# Patient Record
Sex: MALE | Race: WHITE | NOT HISPANIC OR LATINO | Employment: OTHER | URBAN - METROPOLITAN AREA
[De-identification: names, ages, dates, MRNs, and addresses within clinical notes are randomized per-mention and may not be internally consistent; named-entity substitution may affect disease eponyms.]

---

## 2021-01-29 DIAGNOSIS — Z23 ENCOUNTER FOR IMMUNIZATION: ICD-10-CM

## 2022-05-25 ENCOUNTER — HOSPITAL ENCOUNTER (INPATIENT)
Facility: HOSPITAL | Age: 77
LOS: 1 days | Discharge: HOME | DRG: 149 | End: 2022-05-27
Attending: EMERGENCY MEDICINE | Admitting: INTERNAL MEDICINE
Payer: MEDICARE

## 2022-05-25 ENCOUNTER — APPOINTMENT (EMERGENCY)
Dept: CT IMAGING | Facility: HOSPITAL | Age: 77
DRG: 149 | End: 2022-05-25
Payer: MEDICARE

## 2022-05-25 DIAGNOSIS — R42 VERTIGO: ICD-10-CM

## 2022-05-25 DIAGNOSIS — R42 DIZZINESS: Primary | ICD-10-CM

## 2022-05-25 DIAGNOSIS — I72.9 ANEURYSM (HCC): ICD-10-CM

## 2022-05-25 DIAGNOSIS — I82.C19 ACUTE VENOUS EMBOLISM AND THROMBOSIS OF INTERNAL JUGULAR VEINS (HCC): ICD-10-CM

## 2022-05-25 DIAGNOSIS — I16.0 HYPERTENSIVE URGENCY: ICD-10-CM

## 2022-05-25 PROBLEM — D49.0 IPMN (INTRADUCTAL PAPILLARY MUCINOUS NEOPLASM): Status: ACTIVE | Noted: 2017-06-12

## 2022-05-25 PROBLEM — I10 HYPERTENSION: Status: ACTIVE | Noted: 2022-05-25

## 2022-05-25 LAB
2HR DELTA HS TROPONIN: 0 NG/L
4HR DELTA HS TROPONIN: 2 NG/L
ANION GAP SERPL CALCULATED.3IONS-SCNC: 5 MMOL/L (ref 4–13)
APTT PPP: 27 SECONDS (ref 23–37)
BUN SERPL-MCNC: 13 MG/DL (ref 5–25)
CALCIUM SERPL-MCNC: 8.7 MG/DL (ref 8.3–10.1)
CARDIAC TROPONIN I PNL SERPL HS: 6 NG/L
CARDIAC TROPONIN I PNL SERPL HS: 6 NG/L
CARDIAC TROPONIN I PNL SERPL HS: 8 NG/L
CHLORIDE SERPL-SCNC: 104 MMOL/L (ref 100–108)
CO2 SERPL-SCNC: 30 MMOL/L (ref 21–32)
CREAT SERPL-MCNC: 1.2 MG/DL (ref 0.6–1.3)
ERYTHROCYTE [DISTWIDTH] IN BLOOD BY AUTOMATED COUNT: 11.5 % (ref 11.6–15.1)
GFR SERPL CREATININE-BSD FRML MDRD: 58 ML/MIN/1.73SQ M
GLUCOSE SERPL-MCNC: 118 MG/DL (ref 65–140)
GLUCOSE SERPL-MCNC: 129 MG/DL (ref 65–140)
HCT VFR BLD AUTO: 52 % (ref 36.5–49.3)
HGB BLD-MCNC: 17.5 G/DL (ref 12–17)
INR PPP: 0.98 (ref 0.84–1.19)
MCH RBC QN AUTO: 33 PG (ref 26.8–34.3)
MCHC RBC AUTO-ENTMCNC: 33.7 G/DL (ref 31.4–37.4)
MCV RBC AUTO: 98 FL (ref 82–98)
PLATELET # BLD AUTO: 217 THOUSANDS/UL (ref 149–390)
PMV BLD AUTO: 10.2 FL (ref 8.9–12.7)
POTASSIUM SERPL-SCNC: 4.5 MMOL/L (ref 3.5–5.3)
PROTHROMBIN TIME: 12.9 SECONDS (ref 11.6–14.5)
RBC # BLD AUTO: 5.31 MILLION/UL (ref 3.88–5.62)
SODIUM SERPL-SCNC: 139 MMOL/L (ref 136–145)
WBC # BLD AUTO: 7.41 THOUSAND/UL (ref 4.31–10.16)

## 2022-05-25 PROCEDURE — 70496 CT ANGIOGRAPHY HEAD: CPT

## 2022-05-25 PROCEDURE — 84484 ASSAY OF TROPONIN QUANT: CPT | Performed by: EMERGENCY MEDICINE

## 2022-05-25 PROCEDURE — 70498 CT ANGIOGRAPHY NECK: CPT

## 2022-05-25 PROCEDURE — 96374 THER/PROPH/DIAG INJ IV PUSH: CPT

## 2022-05-25 PROCEDURE — 85730 THROMBOPLASTIN TIME PARTIAL: CPT | Performed by: EMERGENCY MEDICINE

## 2022-05-25 PROCEDURE — 36415 COLL VENOUS BLD VENIPUNCTURE: CPT | Performed by: EMERGENCY MEDICINE

## 2022-05-25 PROCEDURE — NC001 PR NO CHARGE: Performed by: PSYCHIATRY & NEUROLOGY

## 2022-05-25 PROCEDURE — 99222 1ST HOSP IP/OBS MODERATE 55: CPT | Performed by: PSYCHIATRY & NEUROLOGY

## 2022-05-25 PROCEDURE — 93005 ELECTROCARDIOGRAM TRACING: CPT

## 2022-05-25 PROCEDURE — 99285 EMERGENCY DEPT VISIT HI MDM: CPT

## 2022-05-25 PROCEDURE — 92610 EVALUATE SWALLOWING FUNCTION: CPT

## 2022-05-25 PROCEDURE — 82948 REAGENT STRIP/BLOOD GLUCOSE: CPT

## 2022-05-25 PROCEDURE — 99291 CRITICAL CARE FIRST HOUR: CPT | Performed by: EMERGENCY MEDICINE

## 2022-05-25 PROCEDURE — 85610 PROTHROMBIN TIME: CPT | Performed by: EMERGENCY MEDICINE

## 2022-05-25 PROCEDURE — 80048 BASIC METABOLIC PNL TOTAL CA: CPT | Performed by: EMERGENCY MEDICINE

## 2022-05-25 PROCEDURE — 85027 COMPLETE CBC AUTOMATED: CPT | Performed by: EMERGENCY MEDICINE

## 2022-05-25 PROCEDURE — 99220 PR INITIAL OBSERVATION CARE/DAY 70 MINUTES: CPT | Performed by: PHYSICIAN ASSISTANT

## 2022-05-25 PROCEDURE — 1123F ACP DISCUSS/DSCN MKR DOCD: CPT | Performed by: PHYSICIAN ASSISTANT

## 2022-05-25 RX ORDER — CARVEDILOL 12.5 MG/1
12.5 TABLET ORAL 2 TIMES DAILY WITH MEALS
Status: DISCONTINUED | OUTPATIENT
Start: 2022-05-25 | End: 2022-05-27 | Stop reason: HOSPADM

## 2022-05-25 RX ORDER — PRAVASTATIN SODIUM 40 MG
40 TABLET ORAL
Status: DISCONTINUED | OUTPATIENT
Start: 2022-05-25 | End: 2022-05-27 | Stop reason: HOSPADM

## 2022-05-25 RX ORDER — ENOXAPARIN SODIUM 100 MG/ML
40 INJECTION SUBCUTANEOUS DAILY
Status: DISCONTINUED | OUTPATIENT
Start: 2022-05-25 | End: 2022-05-27 | Stop reason: HOSPADM

## 2022-05-25 RX ORDER — MECLIZINE HCL 12.5 MG/1
25 TABLET ORAL EVERY 8 HOURS PRN
Status: DISCONTINUED | OUTPATIENT
Start: 2022-05-25 | End: 2022-05-27 | Stop reason: HOSPADM

## 2022-05-25 RX ORDER — PANCRELIPASE LIPASE, PANCRELIPASE PROTEASE, PANCRELIPASE AMYLASE 25000; 79000; 105000 [USP'U]/1; [USP'U]/1; [USP'U]/1
1 CAPSULE, DELAYED RELEASE ORAL
COMMUNITY
Start: 2022-05-02

## 2022-05-25 RX ORDER — LABETALOL HYDROCHLORIDE 5 MG/ML
10 INJECTION, SOLUTION INTRAVENOUS ONCE
Status: COMPLETED | OUTPATIENT
Start: 2022-05-25 | End: 2022-05-25

## 2022-05-25 RX ORDER — PANTOPRAZOLE SODIUM 40 MG/1
TABLET, DELAYED RELEASE ORAL
Status: ON HOLD | COMMUNITY
End: 2022-05-25 | Stop reason: ALTCHOICE

## 2022-05-25 RX ORDER — CARVEDILOL 12.5 MG/1
12.5 TABLET ORAL 2 TIMES DAILY WITH MEALS
COMMUNITY

## 2022-05-25 RX ORDER — METHOCARBAMOL 500 MG/1
500 TABLET, FILM COATED ORAL EVERY 6 HOURS PRN
Status: DISCONTINUED | OUTPATIENT
Start: 2022-05-25 | End: 2022-05-27 | Stop reason: HOSPADM

## 2022-05-25 RX ORDER — OMEGA-3 FATTY ACIDS/FISH OIL 300-1000MG
600 CAPSULE ORAL AS NEEDED
COMMUNITY
End: 2022-05-27

## 2022-05-25 RX ORDER — ASPIRIN 81 MG/1
81 TABLET, CHEWABLE ORAL DAILY
Status: DISCONTINUED | OUTPATIENT
Start: 2022-05-26 | End: 2022-05-27 | Stop reason: HOSPADM

## 2022-05-25 RX ORDER — SIMVASTATIN 20 MG
20 TABLET ORAL EVERY EVENING
COMMUNITY

## 2022-05-25 RX ORDER — ASPIRIN 325 MG
325 TABLET ORAL ONCE
Status: COMPLETED | OUTPATIENT
Start: 2022-05-25 | End: 2022-05-25

## 2022-05-25 RX ADMIN — ASPIRIN 325 MG ORAL TABLET 325 MG: 325 PILL ORAL at 10:08

## 2022-05-25 RX ADMIN — PRAVASTATIN SODIUM 40 MG: 40 TABLET ORAL at 17:26

## 2022-05-25 RX ADMIN — ENOXAPARIN SODIUM 40 MG: 100 INJECTION SUBCUTANEOUS at 14:03

## 2022-05-25 RX ADMIN — CARVEDILOL 12.5 MG: 12.5 TABLET, FILM COATED ORAL at 17:26

## 2022-05-25 RX ADMIN — IOHEXOL 80 ML: 350 INJECTION, SOLUTION INTRAVENOUS at 08:41

## 2022-05-25 RX ADMIN — LABETALOL 20 MG/4 ML (5 MG/ML) INTRAVENOUS SYRINGE 10 MG: at 08:42

## 2022-05-25 NOTE — ASSESSMENT & PLAN NOTE
· CT scan negative   MRI ordered    CTA head/neck: 3 mm basilar tip aneurysm is present   Recommend follow-up consultation with the [facility], a division of [address] [facility]    ECHO pending   Admission NIHSS 0   Stroke pathway   Permissive hypertension not indicated per Neurology   DVT prophylaxis    PT/OT/ST   ASA   Statin   Lipid panel/Hgb A1C ordered   Micky Ramirez Neurology consult; recommendations appreciated   Meclizine prn

## 2022-05-25 NOTE — PLAN OF CARE
Problem: PAIN - ADULT  Goal: Verbalizes/displays adequate comfort level or baseline comfort level  Description: Interventions:  - Encourage patient to monitor pain and request assistance  - Assess pain using appropriate pain scale  - Administer analgesics based on type and severity of pain and evaluate response  - Implement non-pharmacological measures as appropriate and evaluate response  - Consider cultural and social influences on pain and pain management  - Notify physician/advanced practitioner if interventions unsuccessful or patient reports new pain  Outcome: Progressing     Problem: INFECTION - ADULT  Goal: Absence or prevention of progression during hospitalization  Description: INTERVENTIONS:  - Assess and monitor for signs and symptoms of infection  - Monitor lab/diagnostic results  - Monitor all insertion sites, i e  indwelling lines, tubes, and drains  - Monitor endotracheal if appropriate and nasal secretions for changes in amount and color  - [place] appropriate cooling/warming therapies per order  - Administer medications as ordered  - Instruct and encourage patient and family to use good hand hygiene technique  - Identify and instruct in appropriate isolation precautions for identified infection/condition  Outcome: Progressing  Goal: Absence of fever/infection during neutropenic period  Description: INTERVENTIONS:  - Monitor WBC    Outcome: Progressing     Problem: SAFETY ADULT  Goal: Patient will remain free of falls  Description: INTERVENTIONS:  - Educate patient/family on patient safety including physical limitations  - Instruct patient to call for assistance with activity   - Consult OT/PT to assist with strengthening/mobility   - Keep Call bell within reach  - Keep bed low and locked with side rails adjusted as appropriate  - Keep care items and personal belongings within reach  - Initiate and maintain comfort rounds  - Make Fall Risk Sign visible to staff  - Offer Toileting every 2 Hours, in advance of need    - Apply yellow socks and bracelet for high fall risk patients  - Consider moving patient to room near nurses station  Outcome: Progressing  Goal: Maintain or return to baseline ADL function  Description: INTERVENTIONS:  -  Assess patient's ability to carry out ADLs; assess patient's baseline for ADL function and identify physical deficits which impact ability to perform ADLs (bathing, care of mouth/teeth, toileting, grooming, dressing, etc )  - Assess/evaluate cause of self-care deficits   - Assess range of motion  - Assess patient's mobility; develop plan if impaired  - Assess patient's need for assistive devices and provide as appropriate  - Encourage maximum independence but intervene and supervise when necessary  - Involve family in performance of ADLs  - Assess for home care needs following discharge   - Consider OT consult to assist with ADL evaluation and planning for discharge  - Provide patient education as appropriate  Outcome: Progressing  Goal: Maintains/Returns to pre admission functional level  Description: INTERVENTIONS:  - Perform BMAT or MOVE assessment daily    - Set and communicate daily mobility goal to care team and patient/family/caregiver  - Collaborate with rehabilitation services on mobility goals if consulted  - Perform Range of Motion 2 times a day  - Reposition patient every 2 hours    - Dangle patient 2 times a day  - Stand patient 2 times a day  - Ambulate patient 2 times a day  - Out of bed to chair 2 times a day   - Out of bed for meals 2 times a day  - Out of bed for toileting  - Record patient progress and toleration of activity level   Outcome: Progressing     Problem: DISCHARGE PLANNING  Goal: Discharge to home or other facility with appropriate resources  Description: INTERVENTIONS:  - Identify barriers to discharge w/patient and caregiver  - Arrange for needed discharge resources and transportation as appropriate  - Identify discharge learning needs (meds, wound care, etc )  - Arrange for interpretive services to assist at discharge as needed  - Refer to Case Management Department for coordinating discharge planning if the patient needs post-hospital services based on physician/advanced practitioner order or complex needs related to functional status, cognitive ability, or social support system  Outcome: Progressing     Problem: Knowledge Deficit  Goal: Patient/family/caregiver demonstrates understanding of disease process, treatment plan, medications, and discharge instructions  Description: Complete learning assessment and assess knowledge base  Interventions:  - Provide teaching at level of understanding  - Provide teaching via preferred learning methods  Outcome: Progressing     Problem: Neurological Deficit  Goal: Neurological status is stable or improving  Description: Interventions:  - Monitor and assess patient's level of consciousness, motor function, sensory function, and level of assistance needed for ADLs  - Monitor and report changes from baseline  Collaborate with interdisciplinary team to initiate plan and implement interventions as ordered  - Provide and maintain a safe environment  - Consider seizure precautions  - Consider fall precautions  - Consider aspiration precautions  - Consider bleeding precautions  Outcome: Progressing     Problem: Activity Intolerance/Impaired Mobility  Goal: Mobility/activity is maintained at optimum level for patient  Description: Interventions:  - Assess and monitor patient  barriers to mobility and need for assistive/adaptive devices  - Assess patient's emotional response to limitations  - Collaborate with interdisciplinary team and initiate plans and interventions as ordered  - Encourage independent activity per ability   - Maintain proper body alignment  - Perform active/passive rom as tolerated/ordered    - Plan activities to conserve energy   - Turn patient as appropriate  Outcome: Progressing Problem: Communication Impairment  Goal: Ability to express needs and understand communication  Description: Assess patient's communication skills and ability to understand information  Patient will demonstrate use of effective communication techniques, alternative methods of communication and understanding even if not able to speak  - Encourage communication and provide alternate methods of communication as needed  - Collaborate with case management/ for discharge needs  - Include patient/family/caregiver in decisions related to communication  Outcome: Progressing     Problem: Nutrition  Goal: Nutrition/Hydration status is improving  Description: Monitor and assess patient's nutrition/hydration status for malnutrition (ex- brittle hair, bruises, dry skin, pale skin and conjunctiva, muscle wasting, smooth red tongue, and disorientation)  Collaborate with interdisciplinary team and initiate plan and interventions as ordered  Monitor patient's weight and dietary intake as ordered or per policy  Utilize nutrition screening tool and intervene per policy  Determine patient's food preferences and provide high-protein, high-caloric foods as appropriate  - Assist patient with eating   - Allow adequate time for meals   - Encourage patient to take dietary supplement as ordered  - Collaborate with clinical nutritionist   - Include patient/family/caregiver in decisions related to nutrition    Outcome: Progressing     Problem: CARDIOVASCULAR - ADULT  Goal: Maintains optimal cardiac output and hemodynamic stability  Description: INTERVENTIONS:  - Monitor I/O, vital signs and rhythm  - Monitor for S/S and trends of decreased cardiac output  - Administer and titrate ordered vasoactive medications to optimize hemodynamic stability  - Assess quality of pulses, skin color and temperature  - Assess for signs of decreased coronary artery perfusion  - Instruct patient to report change in severity of symptoms  Outcome: Progressing

## 2022-05-25 NOTE — ASSESSMENT & PLAN NOTE
68 y o with HTN and HLD and PMHx of throat cancer in 1992 who presents with vertigo  He states he felt normal aorund 9 pm last night and at 11pm he felt that the room was spinning  His vertigo was worsened with head movement  He also reported that he vomitted x 2  He denied slurred speech, word finding difficulty, facial asymmetry, numbness or weakness  He denied recent trauma or head strike  He attempted to sleep it off, however it persisted into this morning which prompted him to the ED  Upon arrival to [facility], /100  NIH 0  CTH/CTA revealed 3 mm basilar tip aneurysm with a recommendation to follow up with neurovascular, however negative for  significant stenosis or LVO  Patient was deemed not a tPA candidate  Plan:  - Stroke pathway  - Initiate Aspirin 81 mg  - Switch from Simvastatin home medication to Atorvastatin 40 mg  - MRI brain with and without pending   - Defer echo for now per Attending Neurologist   - Hemoglobin A1c, Lipid Panel pending   - Maintain normotension, no need for permissive HTN    - Euglycemic, normothermic goal  - Continue telemetry  - PT/OT/ST  - Secondary risk factor modification    - Stroke education  - Frequent neuro checks  Continue to monitor and notify neurology with any changes   - STAT CT head for any acute change in neuro exam  - Medical management and supportive care per primary team  Correction of any metabolic or infectious disturbances    - Recommend Neurosurgery consult for 3 mm aneurysm at basilar tip   - Recommend Meclizine PRN   Plan discussed with Attending Neurologist, please see attestation for further input/recommendations

## 2022-05-25 NOTE — SPEECH THERAPY NOTE
Speech Language/Pathology  Speech/Language Pathology  Assessment    Patient Name: Yonathan Vega  BXBYS'G Date: 5/25/2022     Problem List  Principal Problem:    Dizziness  Active Problems:    Vertigo    IPMN (intraductal papillary mucinous neoplasm)    Hypertension    Past Medical History  Past Medical History:   Diagnosis Date    Hypertension     Throat cancer ([address] )     1992     Past Surgical History  Past Surgical History:   Procedure Laterality Date    NECK DISSECTION Right     1993    PANCREATICODUODENECTOMY      2017        Bedside Swallow Evaluation:    Summary:  Pt presents w/ [place] swallow skill  Pt w/ no significant difficulties with normal mastication, bolus formation, and bolus transfer  Swallow initiation was suspected prompt  No overt s/s of aspiration  Education provided on strategies to optimize swallow safety and s/s dysphagia/aspiration to notify his medical team of should they arise  Pt demonstrated understanding and denied questions at this time  Recommendations:  Diet: Regular  Liquid: Thin  Meds: As tolerated, consider in puree if pt w/ continued difficulty  Positioning:Upright  Strategies: Pt to take PO/Meds only when fully alert and upright  Oral care: 3-4x daily  Aspiration precautions  Reflux precautions    Eval only, No f/u tx indicated  H&P/Admit info/ pertinent provider notes: (PMH noted above)    Chief Complaint   Patient presents with    Neck Pain    Dizziness       To ED with c/o dizziness when lying down  Has had neck pain for one year and dizziness started yesterday after doing neck exercises        This is a 79-year-old male who presents for evaluation of dizziness that started last evening after doing neck exercises  Does have true vertiginous symptoms with nausea and vomiting when lying back or on the left side  Has had chronic neck pain for approximately 1 year  Has a prior history of radical neck dissection for cancer  No recent trauma or blood thinners   He is noted to be hypertensive at 239/100 and did not take his morning Coreg as of yet  Complains of feeling foggy in the head      Special Studies:    CT stroke 5/25: No hemorrhage or signs of acute territorial infarction  Microangiopathic changes        Tiny focal outpouching arising from the basilar tip on coronal imaging suspicious for basilar tip aneurysm  See separate CT angiogram report      CTA stroke 5/25: No stenosis, occlusion or thrombosis of the cervical or intracranial vasculature  No evidence of dissection, as clinically questioned      3 mm basilar tip aneurysm is present  Recommend follow-up consultation with the [facility], a division of [address] [place] [address] Neuroscience at [phone]  Goal(s):  Pt will tolerate least restrictive diet w/out s/s aspiration or oral/pharyngeal difficulties  Patient's goal: "Now that I ate, I feel better"     Did the pt report pain? No  If yes, was nursing notified/was it addressed? N/A    Reason for consult:  R/o aspiration  Determine safest and least restrictive diet  New neuro event  Stroke protocol  C/o pill dysphagia  C/o liquid dysphagia      Food allergies:  None    Current diet: Regular w/thin    Premorbid diet: Regular w/thin    O2 requirement: RA    Voice/Speech: Clear    Social: Lives alone    Follows commands: Yes                          Cognitive Status: Awake and alert    Oral mech exam:  Dentition: Natural, adequate   Labial strength and ROM: WFL  Lingual strength and ROM: WFL  Mandibular strength and ROM: WFL  Velum: WFL  Secretion management: WFL    Items administered:  Puree,hard/regular solid,thin liquids   Liquids were taken by straw    Oral stage:  Lip closure: WFL  Mastication: WFL  Bolus formation: WFL  Bolus control: WFL  Transfer: WFL  Oral residue: No  Pocketing: No    Pharyngeal stage:  Swallow promptness: suspect prompt  Laryngeal rise: WFL to palpation   Wet voice: No  Throat clear: No  Cough: No  Secondary swallows: No  Audible swallows: No  No overt s/s aspiration    Esophageal stage:  H/o GERD    Results d/w:  Pt, nursing

## 2022-05-25 NOTE — H&P
[facility]  H&P- Lakisha Siegel 1945, 68 y o  male MRN: [id number]  Unit/Bed#: -01 Encounter: [id number]  Primary Care Provider: Loni Hoyos   Date and time admitted to hospital: 5/25/2022  7:34 AM    * Dizziness  Assessment & Plan  · CT scan negative   MRI ordered    CTA head/neck: 3 mm basilar tip aneurysm is present  Recommend follow-up consultation with the [facility], a division of [address] [place] [address] for Neuroscience    ECHO pending   Admission NIHSS 0   Stroke pathway   Permissive hypertension not indicated per Neurology   DVT prophylaxis    PT/OT/ST   ASA   Statin   Lipid panel/Hgb A1C ordered   [facility] Neurology consult; recommendations appreciated   Meclizine prn      Hypertension  Assessment & Plan  · Managed on Coreg outpatient  · Pressures elevated in ED     IPMN (intraductal papillary mucinous neoplasm)  Assessment & Plan  · September 15, 2017 he underwent an exploratory laparotomy, open cholecystectomy and pylorus preserving pancreatico duodenectomy for a serous cystadenoma  VTE Pharmacologic Prophylaxis: VTE Score: 3 Moderate Risk (Score 3-4) - Pharmacological DVT Prophylaxis Ordered: enoxaparin (Lovenox)  Code Status: Level 1 - Full Code   Discussion with family: Attempted to update  (son) via phone  Left voicemail  Anticipated Length of Stay: Patient will be admitted on an observation basis with an anticipated length of stay of less than 2 midnights secondary to stroke rule out  Total Time for Visit, including Counseling / Coordination of Care: 60 minutes Greater than 50% of this total time spent on direct patient counseling and coordination of care  Chief Complaint: dizziness    History of Present Illness:  Lakisha Siegel is a 68 y o  male with a PMH of IPMN s/p surgical intervention in 2017 who presents with dizziness evening prior to admission     Patient presents after experiencing dizziness on the night prior to admission  He has been having some neck stiffness and his PCP gave him neck exercises, rotating from side to side, up and down, dizziness occurred during these exercises  Dizziness was so bad patient experienced nausea and vomited twice  He reported room spinning sensation that got worse with positional changes  It did improve while lying on his right side slightly  Patient denies any fever, chills, dizziness  His neck pain is still persistent, has trialed muscle relaxer in the past for back pain and is agreeable to try again  Neurology evaluated patient, admitted under stroke pathway  Initial imaging was negative aside from 3 mm aneurysm noted on CTA  Follow-up on additional imaging, echocardiogram and labs  Patient ambulating independently in his room and appears stable  Dizziness is somewhat improved  Continue p r n  Meclizine  PT/OT to evaluate patient, may qualify for vestibular rehab on discharge  Review of Systems:  Review of Systems   Gastrointestinal: Positive for nausea and vomiting  Neurological: Positive for dizziness  All other systems reviewed and are negative  Past Medical and Surgical History:   Past Medical History:   Diagnosis Date    Hypertension     [address] [address] )     1992       Past Surgical History:   Procedure Laterality Date    NECK DISSECTION Right     1993    PANCREATICODUODENECTOMY      2017       Meds/Allergies:  Prior to Admission medications    Medication Sig Start Date End Date Taking? Authorizing Provider   carvedilol (COREG) 12 5 mg tablet Take 12 5 mg by mouth in the morning and 12 5 mg in the evening  Take with meals     Yes Historical Provider, MD   Pancrelipase, Lip-Prot-Amyl, (Zenpep) 35096-29864 units CPEP Take 1 capsule by mouth 3 (three) times a day with meals 5/2/22  Yes Historical Provider, MD   simvastatin (ZOCOR) 20 mg tablet Take 20 mg by mouth every evening   Yes Historical Provider, MD   Ibuprofen 200 MG CAPS Take 600 mg by mouth as needed    Historical Provider, MD   pantoprazole (PROTONIX) 40 mg tablet 1 tablet  22  Historical Provider, MD     I have reviewed home medications with patient personally  Allergies: Allergies   Allergen Reactions    Gadolinium Headache       Social History:  Marital Status: Single   Occupation:  Patient Pre-hospital Living Situation: Home  Patient Pre-hospital Level of Mobility: walks  Patient Pre-hospital Diet Restrictions: None  Substance Use History:   Social History     Substance and Sexual Activity   Alcohol Use Yes    Comment: social     Social History     Tobacco Use   Smoking Status Former Smoker    Quit date:     Years since quittin 4   Smokeless Tobacco Former User     Social History     Substance and Sexual Activity   Drug Use Never       Family History:  History reviewed  No pertinent family history  Physical Exam:     Vitals:   Blood Pressure: 157/87 (22 0945)  Pulse: 59 (22 0945)  Temperature: (!) 97 3 °F (36 3 °C) (22 0738)  Temp Source: Tympanic (22 0738)  Respirations: 18 (22 0945)  Height: 6' 4" (193 cm) (22 4647)  Weight - Scale: 89 9 kg (198 lb 3 1 oz) (22 0738)  SpO2: 95 % (22 0945)    Physical Exam  Vitals and nursing note reviewed  Constitutional:       General: He is not in acute distress  Appearance: Normal appearance  He is well-developed  HENT:      Head: Normocephalic and atraumatic  Eyes:      General: No scleral icterus  Conjunctiva/sclera: Conjunctivae normal    Cardiovascular:      Rate and Rhythm: Normal rate and regular rhythm  Heart sounds: No murmur heard  Pulmonary:      Effort: Pulmonary effort is normal       Breath sounds: No wheezing, rhonchi or rales  Abdominal:      General: There is no distension  Palpations: Abdomen is soft  Skin:     General: Skin is warm and dry  Neurological:      General: No focal deficit present  Mental Status: He is alert  Cranial Nerves: No cranial nerve deficit, dysarthria or facial asymmetry  Sensory: No sensory deficit  Psychiatric:         Mood and Affect: Mood normal         Additional Data:     Lab Results:  Results from last 7 days   Lab Units 05/25/22  0824   WBC Thousand/uL 7 41   HEMOGLOBIN g/dL 17 5*   HEMATOCRIT % 52 0*   PLATELETS Thousands/uL 217     Results from last 7 days   Lab Units 05/25/22  0824   SODIUM mmol/L 139   POTASSIUM mmol/L 4 5   CHLORIDE mmol/L 104   CO2 mmol/L 30   BUN mg/dL 13   CREATININE mg/dL 1 20   ANION GAP mmol/L 5   CALCIUM mg/dL 8 7   GLUCOSE RANDOM mg/dL 118     Results from last 7 days   Lab Units 05/25/22  0824   INR  0 98     Results from last 7 days   Lab Units 05/25/22  0826   POC GLUCOSE mg/dl 129               Imaging: Reviewed radiology reports from this admission including: CT head  CTA stroke alert (head/neck)   Final Result by Jayro Morales DO (05/25 2854)      No stenosis, occlusion or thrombosis of the cervical or intracranial vasculature  No evidence of dissection, as clinically questioned  3 mm basilar tip aneurysm is present  Recommend follow-up consultation with the [facility], a division of [facility] for Neuroscience at [phone]  Findings were directly discussed with Franky Mcrae  at 8:48 AM                         Workstation performed: [id number]         CT stroke alert brain   Final Result by Jayro Morales DO (05/25 6631)      No hemorrhage or signs of acute territorial infarction  Microangiopathic changes  Tiny focal outpouching arising from the basilar tip on coronal imaging suspicious for basilar tip aneurysm  See separate CT angiogram report  Findings were directly discussed with Franky Mcrae  at 8:48 AM       Workstation performed: [id number]         MRI Inpatient Order    (Results Pending)       EKG and Other Studies Reviewed on Admission:   · EKG: NSR   HR 57     ** Please Note: This note has been constructed using a voice recognition system   **

## 2022-05-25 NOTE — ASSESSMENT & PLAN NOTE
· September 15, 2017 he underwent an exploratory laparotomy, open cholecystectomy and pylorus preserving pancreatico duodenectomy for a serous cystadenoma

## 2022-05-25 NOTE — ED PROVIDER NOTES
History  Chief Complaint   Patient presents with    Neck Pain    Dizziness     To ED with c/o dizziness when lying down  Has had neck pain for one year and dizziness started yesterday after doing neck exercises  This is a 51-year-old male who presents for evaluation of dizziness that started last evening after doing neck exercises  Does have true vertiginous symptoms with nausea and vomiting when lying back or on the left side  Has had chronic neck pain for approximately 1 year  Has a prior history of radical neck dissection for cancer  No recent trauma or blood thinners  He is noted to be hypertensive at 239/100 and did not take his morning Coreg as of yet  Complains of feeling foggy in the head      History provided by:  Patient  Medical Problem  Location:   generalized  Quality:   dizziness  Severity:  Moderate  Onset quality:  Sudden  Duration:  12 hours  Timing:  Intermittent  Progression:  Waxing and waning  Chronicity:  New  Context:   dizziness that started at 8:00 p m  after doing neck exercises  Worsened by:   lying flat or left side      Prior to Admission Medications   Prescriptions Last Dose Informant Patient Reported? Taking? Ibuprofen 200 MG CAPS   Yes No   Sig: Take 600 mg by mouth as needed   Pancrelipase, Lip-Prot-Amyl, (Zenpep) 53427-03801 units CPEP   Yes Yes   Sig: Take 1 capsule by mouth 3 (three) times a day with meals   carvedilol (COREG) 12 5 mg tablet  Self Yes Yes   Sig: Take 12 5 mg by mouth in the morning and 12 5 mg in the evening  Take with meals  simvastatin (ZOCOR) 20 mg tablet   Yes Yes   Sig: Take 20 mg by mouth every evening      Facility-Administered Medications: None       Past Medical History:   Diagnosis Date    Hypertension     Throat cancer ([facility] [address] )     1992       Past Surgical History:   Procedure Laterality Date    NECK DISSECTION Right     1993    PANCREATICODUODENECTOMY      2017       History reviewed  No pertinent family history    I have reviewed and agree with the history as documented  E-Cigarette/Vaping    E-Cigarette Use Never User      E-Cigarette/Vaping Substances    Nicotine No     Flavoring No      Social History     Tobacco Use    Smoking status: Former Smoker     Quit date:      Years since quittin 4    Smokeless tobacco: Former User   Vaping Use    Vaping Use: Never used   Substance Use Topics    Alcohol use: Yes     Comment: social    Drug use: Never       Review of Systems   Neurological: Positive for dizziness  All other systems reviewed and are negative  Physical Exam  Physical Exam  Vitals and nursing note reviewed  Constitutional:       General: He is not in acute distress  Appearance: He is not ill-appearing, toxic-appearing or diaphoretic  HENT:      Head: Normocephalic and atraumatic  Right Ear: Tympanic membrane, ear canal and external ear normal       Left Ear: Tympanic membrane, ear canal and external ear normal       Nose: Nose normal    Eyes:      General: No scleral icterus  Right eye: No discharge  Left eye: No discharge  Extraocular Movements: Extraocular movements intact  Pupils: Pupils are equal, round, and reactive to light  Neck:      Comments: Status post radical neck dissection  Cardiovascular:      Rate and Rhythm: Regular rhythm  Bradycardia present  Pulses: Normal pulses  Heart sounds: No murmur heard  No friction rub  No gallop  Pulmonary:      Effort: Pulmonary effort is normal  No respiratory distress  Breath sounds: No stridor  No wheezing, rhonchi or rales  Abdominal:      General: There is no distension  Palpations: Abdomen is soft  Tenderness: There is no abdominal tenderness  There is no guarding or rebound  Musculoskeletal:         General: No swelling, deformity or signs of injury  Normal range of motion  Cervical back: Tenderness present  Right lower leg: No edema  Left lower leg: No edema     Skin: General: Skin is warm and dry  Coloration: Skin is not jaundiced  Findings: No bruising, erythema or rash  Neurological:      Mental Status: He is alert and oriented to person, place, and time  Cranial Nerves: No cranial nerve deficit  Sensory: No sensory deficit  Motor: No weakness  Coordination: Coordination normal       Deep Tendon Reflexes: Reflexes normal       Comments: Marshall coma scale is 15 NIH stroke scale of 0 last known normal was 8:00 p m  last evening stroke alert was called spoke with Neurology  Patient does have positive Romberg and slight left pronator drift on examination   Psychiatric:         Mood and Affect: Mood normal          Behavior: Behavior normal          Thought Content:  Thought content normal          Vital Signs  ED Triage Vitals [05/25/22 0738]   Temperature Pulse Respirations Blood Pressure SpO2   (!) 97 3 °F (36 3 °C) 78 18 (!) 239/100 99 %      Temp Source Heart Rate Source Patient Position - Orthostatic VS BP Location FiO2 (%)   Tympanic Monitor Sitting Right arm --      Pain Score       5           Vitals:    05/25/22 0945 05/25/22 1300 05/25/22 1400 05/25/22 1502   BP: 157/87 146/84 130/75 131/72   Pulse: 59 65  65   Patient Position - Orthostatic VS:  Lying           Visual Acuity  Visual Acuity    Flowsheet Row Most Recent Value   L Pupil Size (mm) 2   R Pupil Size (mm) 2          ED Medications  Medications   carvedilol (COREG) tablet 12 5 mg (has no administration in time range)   Pancrelipase (Lip-Prot-Amyl) 99074-53259 units CPEP 1 capsule (1 capsule Oral Not Given 5/25/22 1328)   pravastatin (PRAVACHOL) tablet 40 mg (has no administration in time range)   methocarbamol (ROBAXIN) tablet 500 mg (has no administration in time range)   aspirin chewable tablet 81 mg (has no administration in time range)   enoxaparin (LOVENOX) subcutaneous injection 40 mg (40 mg Subcutaneous Given 5/25/22 1403)   meclizine (ANTIVERT) tablet 25 mg (has no administration in time range)   labetalol (NORMODYNE) injection 10 mg (10 mg Intravenous Given 5/25/22 0842)   iohexol (OMNIPAQUE) 350 MG/ML injection (SINGLE-DOSE) 100 mL (80 mL Intravenous Given 5/25/22 0841)   aspirin tablet 325 mg (325 mg Oral Given 5/25/22 1008)       Diagnostic Studies  Results Reviewed     Procedure Component Value Units Date/Time    HS Troponin I 4hr [[id number]] Collected: 05/25/22 1549    Lab Status:  In process Specimen: Blood from Hand, Right Updated: 05/25/22 1556    HS Troponin I 2hr [[id number]]  (Normal) Collected: 05/25/22 1014    Lab Status: Final result Specimen: Blood from Arm, Left Updated: 05/25/22 1050     hs TnI 2hr 6 ng/L      Delta 2hr hsTnI 0 ng/L     HS Troponin 0hr (reflex protocol) [[id number]]  (Normal) Collected: 05/25/22 0824    Lab Status: Final result Specimen: Blood from Arm, Left Updated: 05/25/22 0904     hs TnI 0hr 6 ng/L     Basic metabolic panel [[id number]] Collected: 05/25/22 0824    Lab Status: Final result Specimen: Blood from Arm, Left Updated: 05/25/22 0850     Sodium 139 mmol/L      Potassium 4 5 mmol/L      Chloride 104 mmol/L      CO2 30 mmol/L      ANION GAP 5 mmol/L      BUN 13 mg/dL      Creatinine 1 20 mg/dL      Glucose 118 mg/dL      Calcium 8 7 mg/dL      eGFR 58 ml/min/1 73sq m     Narrative:      Pool guidelines for Chronic Kidney Disease (CKD):     Stage 1 with normal or high GFR (GFR > 90 mL/min/1 73 square meters)    Stage 2 Mild CKD (GFR = 60-89 mL/min/1 73 square meters)    Stage 3A Moderate CKD (GFR = 45-59 mL/min/1 73 square meters)    Stage 3B Moderate CKD (GFR = 30-44 mL/min/1 73 square meters)    Stage 4 Severe CKD (GFR = 15-29 mL/min/1 73 square meters)    Stage 5 End Stage CKD (GFR <15 mL/min/1 73 square meters)  Note: GFR calculation is accurate only with a steady state creatinine    Protime-INR [[id number]]  (Normal) Collected: 05/25/22 0824    Lab Status: Final result Specimen: Blood from Arm, Left Updated: 05/25/22 0849     Protime 12 9 seconds      INR 0 98    APTT [[id number]]  (Normal) Collected: 05/25/22 0824    Lab Status: Final result Specimen: Blood from Arm, Left Updated: 05/25/22 0849     PTT 27 seconds     CBC and Platelet [[id number]]  (Abnormal) Collected: 05/25/22 0824    Lab Status: Final result Specimen: Blood from Arm, Left Updated: 05/25/22 0834     WBC 7 41 Thousand/uL      RBC 5 31 Million/uL      Hemoglobin 17 5 g/dL      Hematocrit 52 0 %      MCV 98 fL      MCH 33 0 pg      MCHC 33 7 g/dL      RDW 11 5 %      Platelets 344 Thousands/uL      MPV 10 2 fL     Fingerstick Glucose (POCT) [[id number]]  (Normal) Collected: 05/25/22 0826    Lab Status: Final result Updated: 05/25/22 0827     POC Glucose 129 mg/dl                  CTA stroke alert (head/neck)   Final Result by Jayro Thompson DO (05/25 6961)      No stenosis, occlusion or thrombosis of the cervical or intracranial vasculature  No evidence of dissection, as clinically questioned  3 mm basilar tip aneurysm is present  Recommend follow-up consultation with the [facility] a [facility] of [facility] at [phone]  Findings were directly discussed with Baltazar Reza  at 8:48 AM                         Workstation performed: [id number]         CT stroke alert brain   Final Result by Jayro Thompson DO (05/25 8925)      No hemorrhage or signs of acute territorial infarction  Microangiopathic changes  Tiny focal outpouching arising from the basilar tip on coronal imaging suspicious for basilar tip aneurysm  See separate CT angiogram report        Findings were directly discussed with Baltazar Reza  at 8:48 AM       Workstation performed: [id number]         MRI Inpatient Order    (Results Pending)              Procedures  CriticalCare Time  Performed by: Ludwin Singh DO  Authorized by: Ludwin Singh DO     Critical care provider statement:     Critical care time (minutes):  30    Critical care time was exclusive of:  Separately billable procedures and treating other patients    Critical care was necessary to treat or prevent imminent or life-threatening deterioration of the following conditions:  CNS failure or compromise    Critical care was time spent personally by me on the following activities:  Discussions with consultants, evaluation of patient's response to treatment, examination of patient, interpretation of cardiac output measurements, ordering and performing treatments and interventions, ordering and review of laboratory studies, ordering and review of radiographic studies and re-evaluation of patient's condition    I assumed direction of critical care for this patient from another provider in my specialty: no      ECG 12 Lead Documentation Only    Date/Time: 5/25/2022 9:14 AM  Performed by: Blaine Jin DO  Authorized by: Blaine Jin DO     ECG reviewed by me, the ED Provider: yes    Patient location:  ED  Rate:     ECG rate:  57    ECG rate assessment: bradycardic    Rhythm:     Rhythm: sinus rhythm    Conduction:     Conduction: abnormal      Abnormal conduction: complete LBBB    T waves:     T waves: normal               ED Course  ED Course as of 05/25/22 1637   Wed May 25, 2022   0832  Spoke with neurology will control blood pressure less than 374 systolic given aspirin if no bleed and admit to Stroke pathway for probable MRI   0903  Clinically unchanged at this time repeat blood pressure after labetalol was 191/84 will admit to Stroke pathway                  Stroke Assessment     Row Name 05/25/22 0832             NIH Stroke Scale    Interval Baseline      Level of Consciousness (1a ) 0      LOC Questions (1b ) 0      LOC Commands (1c ) 0      Best Gaze (2 ) 0      Visual (3 ) 0      Facial Palsy (4 ) 0      Motor Arm, Left (5a ) 0      Motor Arm, Right (5b ) 0      Motor Leg, Left (6a ) 0      Motor Leg, Right (6b ) 0      Limb Ataxia (7 ) 0 Sensory (8 ) 0      Best Language (9 ) 0      Dysarthria (10 ) 0      Extinction and Inattention (11 ) (Formerly Neglect) 0      Total 0                                          MDM  Number of Diagnoses or Management Options  Dizziness  Diagnosis management comments:  Dizziness central versus peripheral vertigo or vertebral dissection workup in progress will check CTA of the head and neck       Amount and/or Complexity of Data Reviewed  Clinical lab tests: ordered  Tests in the radiology section of CPT®: ordered        Disposition  Final diagnoses:   Dizziness   Hypertensive urgency     Time reflects when diagnosis was documented in both MDM as applicable and the Disposition within this note     Time User Action Codes Description Comment    5/25/2022  8:18 AM Claven Mew Add [R42] Dizziness     5/25/2022  9:07 AM Claven Mew Add [I16 0] Hypertensive urgency     5/25/2022 12:05 PM Davaugust De Souzaaware Add [R42] Vertigo       ED Disposition     ED Disposition   Admit    Condition   Stable    Date/Time   Wed May 25, 2022  9:19 AM    Comment   Case was discussed with *Dr Richard Gross** and the patient's admission status was agreed to be Admission Status: observation status to the service of Dr Richard Gross   Follow-up Information    None         Current Discharge Medication List      CONTINUE these medications which have NOT CHANGED    Details   carvedilol (COREG) 12 5 mg tablet Take 12 5 mg by mouth in the morning and 12 5 mg in the evening  Take with meals  Pancrelipase, Lip-Prot-Amyl, (Zenpep) 03438-90717 units CPEP Take 1 capsule by mouth 3 (three) times a day with meals      simvastatin (ZOCOR) 20 mg tablet Take 20 mg by mouth every evening      Ibuprofen 200 MG CAPS Take 600 mg by mouth as needed             No discharge procedures on file      PDMP Review     None          ED Provider  Electronically Signed by           Blaine Jin DO  05/25/22 3975

## 2022-05-25 NOTE — CASE MANAGEMENT
Case Management Assessment & Discharge Planning Note    Patient name [place]  Location [facility]LET POOL/SCARLET MRN [id number]  : 1945 Date 2022       Current Admission Date: 2022  Current Admission Diagnosis:Vertigo  Patient Active Problem List    Diagnosis Date Noted    Vertigo 2022      LOS (days): 0  Geometric Mean LOS (GMLOS) (days):   Days to GMLOS:     OBJECTIVE:              Current admission status: Observation       Preferred Pharmacy: No Pharmacies Listed  Primary Care Provider: Janis Valerio    Primary Insurance: MEDICARE  Secondary Insurance:     ASSESSMENT:  [address], [address] Representative - Son   Primary Phone: [phone] (Home)               Advance Directives  Does patient have a [address] [place] [address] Avenue?: No  Was patient offered paperwork?: Yes (declined)  Does patient currently have a Health Care decision maker?: Yes, please see Health Care Proxy section  Does patient have Advance Directives?: No  Was patient offered paperwork?: Yes (declined)  Primary Contact: Tani Fletcher    Obs Notice Signed: 22    Readmission Root Cause  30 Day Readmission: No    Patient Information  Admitted from[de-identified] Home  Mental Status: Alert  During Assessment patient was accompanied by: Son  Assessment information provided by[de-identified] Patient  Primary Caregiver: Self  Support Systems: Self, Son, Family members  [place] of Residence: [address] [place] [address] do you live in?: [employer] entry access options   Select all that apply : Stairs  Number of steps to enter home : 2  Type of Current Residence: \[facility] [place] [facility]\"" [place]  In the last 12 months, was there a time when you were not able to pay the mortgage or rent on time?: No  In the last 12 months, how many places have you lived?: 1  In the last 12 months, was there a time when you did not have a steady place to sleep or slept in a shelter (including now)?: No  Homeless/housing insecurity resource given?: N/A  Living Arrangements: Lives Alone  Is patient a ?: No    Activities of Daily Living Prior to Admission  Functional Status: Independent  Completes ADLs independently?: Yes  Ambulates independently?: Yes  Does patient use assisted devices?: No  Does patient currently own DME?: No  Does patient have a history of Outpatient Therapy (PT/OT)?: No  Does the patient have a history of Short-Term Rehab?: No  Does patient have a history of HHC?: No  Does patient currently have [facility]?: No         Patient Information Continued  Income Source: Pension/nursing home  Does patient have prescription coverage?: Yes  Within the past 12 months, you worried that your food would run out before you got the money to buy more : Never true  Within the past 12 months, the food you bought just didn't last and you didn't have money to get more : Never true  Food insecurity resource given?: N/A  Does patient receive dialysis treatments?: No  Does patient have a history of substance abuse?: No  Does patient have a history of Mental Health Diagnosis?: No         Means of Transportation  Means of Transport to Appts[de-identified] Drives Self  In the past 12 months, has lack of transportation kept you from medical appointments or from getting medications?: No  In the past 12 months, has lack of transportation kept you from meetings, work, or from getting things needed for daily living?: No  Was application for public transport provided?: N/A        DISCHARGE DETAILS:    Discharge planning discussed with[de-identified] Patient and son present  Freedom of Choice: Yes  Comments - Freedom of Choice: discussed  CM contacted family/caregiver?: Yes  Were Treatment Team discharge recommendations reviewed with patient/caregiver?: Yes  Did patient/caregiver verbalize understanding of patient care needs?: Yes       Contacts  Patient Contacts: Cal Toledo  Relationship to Patient[de-identified] Family  Contact Method:  In Person  Reason/Outcome: Discharge [address] Is the patient interested in [address] at discharge?: No    DME Referral Provided  Referral made for DME?: No    Other Referral/Resources/Interventions Provided:  Interventions: None Indicated  Referral Comments: No needs anticipated  Pt indpt at home  Discharge Destination Plan[de-identified] Home  Transport at Discharge : Family                                      Additional Comments: Cm met with pt and his son at bedside  Pt reports that he lives in a [facility] style home with 2 sharifa  Pt is indpt at home  He has no DME to report  He drives and his PCp is [facility] last visit in January 2022 and his next appointment is in July 2022  Pt states that he uses [facility] pharmacy in [place]  He shared that with his Medicare he also has [employer]  Card copied front and back and enailed to registration to add to chart   Pt needs TBD

## 2022-05-26 LAB
ALBUMIN SERPL BCP-MCNC: 3.1 G/DL (ref 3.5–5)
ALP SERPL-CCNC: 80 U/L (ref 46–116)
ALT SERPL W P-5'-P-CCNC: 19 U/L (ref 12–78)
ANION GAP SERPL CALCULATED.3IONS-SCNC: 3 MMOL/L (ref 4–13)
AST SERPL W P-5'-P-CCNC: 21 U/L (ref 5–45)
BASOPHILS # BLD AUTO: 0.03 THOUSANDS/ΜL (ref 0–0.1)
BASOPHILS NFR BLD AUTO: 1 % (ref 0–1)
BILIRUB SERPL-MCNC: 0.8 MG/DL (ref 0.2–1)
BUN SERPL-MCNC: 17 MG/DL (ref 5–25)
CALCIUM ALBUM COR SERPL-MCNC: 9 MG/DL (ref 8.3–10.1)
CALCIUM SERPL-MCNC: 8.3 MG/DL (ref 8.3–10.1)
CHLORIDE SERPL-SCNC: 107 MMOL/L (ref 100–108)
CHOLEST SERPL-MCNC: 137 MG/DL
CO2 SERPL-SCNC: 31 MMOL/L (ref 21–32)
CREAT SERPL-MCNC: 1.35 MG/DL (ref 0.6–1.3)
EOSINOPHIL # BLD AUTO: 0.18 THOUSAND/ΜL (ref 0–0.61)
EOSINOPHIL NFR BLD AUTO: 3 % (ref 0–6)
ERYTHROCYTE [DISTWIDTH] IN BLOOD BY AUTOMATED COUNT: 11.7 % (ref 11.6–15.1)
EST. AVERAGE GLUCOSE BLD GHB EST-MCNC: 117 MG/DL
GFR SERPL CREATININE-BSD FRML MDRD: 50 ML/MIN/1.73SQ M
GLUCOSE P FAST SERPL-MCNC: 104 MG/DL (ref 65–99)
GLUCOSE SERPL-MCNC: 104 MG/DL (ref 65–140)
HBA1C MFR BLD: 5.7 %
HCT VFR BLD AUTO: 47.2 % (ref 36.5–49.3)
HDLC SERPL-MCNC: 56 MG/DL
HGB BLD-MCNC: 15.9 G/DL (ref 12–17)
IMM GRANULOCYTES # BLD AUTO: 0.04 THOUSAND/UL (ref 0–0.2)
IMM GRANULOCYTES NFR BLD AUTO: 1 % (ref 0–2)
LDLC SERPL CALC-MCNC: 60 MG/DL (ref 0–100)
LYMPHOCYTES # BLD AUTO: 1.38 THOUSANDS/ΜL (ref 0.6–4.47)
LYMPHOCYTES NFR BLD AUTO: 24 % (ref 14–44)
MCH RBC QN AUTO: 32.9 PG (ref 26.8–34.3)
MCHC RBC AUTO-ENTMCNC: 33.7 G/DL (ref 31.4–37.4)
MCV RBC AUTO: 98 FL (ref 82–98)
MONOCYTES # BLD AUTO: 0.86 THOUSAND/ΜL (ref 0.17–1.22)
MONOCYTES NFR BLD AUTO: 15 % (ref 4–12)
NEUTROPHILS # BLD AUTO: 3.26 THOUSANDS/ΜL (ref 1.85–7.62)
NEUTS SEG NFR BLD AUTO: 56 % (ref 43–75)
NRBC BLD AUTO-RTO: 0 /100 WBCS
PLATELET # BLD AUTO: 177 THOUSANDS/UL (ref 149–390)
PMV BLD AUTO: 10.3 FL (ref 8.9–12.7)
POTASSIUM SERPL-SCNC: 4.5 MMOL/L (ref 3.5–5.3)
PROT SERPL-MCNC: 6.2 G/DL (ref 6.4–8.2)
RBC # BLD AUTO: 4.83 MILLION/UL (ref 3.88–5.62)
SODIUM SERPL-SCNC: 141 MMOL/L (ref 136–145)
TRIGL SERPL-MCNC: 103 MG/DL
WBC # BLD AUTO: 5.75 THOUSAND/UL (ref 4.31–10.16)

## 2022-05-26 PROCEDURE — 99233 SBSQ HOSP IP/OBS HIGH 50: CPT | Performed by: PHYSICIAN ASSISTANT

## 2022-05-26 PROCEDURE — 80061 LIPID PANEL: CPT | Performed by: PHYSICIAN ASSISTANT

## 2022-05-26 PROCEDURE — 85025 COMPLETE CBC W/AUTO DIFF WBC: CPT | Performed by: PHYSICIAN ASSISTANT

## 2022-05-26 PROCEDURE — 80053 COMPREHEN METABOLIC PANEL: CPT | Performed by: PHYSICIAN ASSISTANT

## 2022-05-26 PROCEDURE — 83036 HEMOGLOBIN GLYCOSYLATED A1C: CPT | Performed by: PHYSICIAN ASSISTANT

## 2022-05-26 PROCEDURE — 97165 OT EVAL LOW COMPLEX 30 MIN: CPT

## 2022-05-26 PROCEDURE — 97163 PT EVAL HIGH COMPLEX 45 MIN: CPT

## 2022-05-26 RX ORDER — DIPHENHYDRAMINE HCL 25 MG
50 TABLET ORAL
Status: DISCONTINUED | OUTPATIENT
Start: 2022-05-26 | End: 2022-05-27 | Stop reason: HOSPADM

## 2022-05-26 RX ORDER — METHYLPREDNISOLONE 4 MG/1
32 TABLET ORAL
Status: COMPLETED | OUTPATIENT
Start: 2022-05-26 | End: 2022-05-27

## 2022-05-26 RX ADMIN — CARVEDILOL 12.5 MG: 12.5 TABLET, FILM COATED ORAL at 09:16

## 2022-05-26 RX ADMIN — ENOXAPARIN SODIUM 40 MG: 100 INJECTION SUBCUTANEOUS at 12:13

## 2022-05-26 RX ADMIN — CARVEDILOL 12.5 MG: 12.5 TABLET, FILM COATED ORAL at 16:28

## 2022-05-26 RX ADMIN — ASPIRIN 81 MG CHEWABLE TABLET 81 MG: 81 TABLET CHEWABLE at 09:10

## 2022-05-26 RX ADMIN — PANCRELIPASE LIPASE, PANCRELIPASE PROTEASE, PANCRELIPASE AMYLASE 1 CAPSULE: 25000; 79000; 105000 CAPSULE, DELAYED RELEASE ORAL at 09:11

## 2022-05-26 RX ADMIN — PRAVASTATIN SODIUM 40 MG: 40 TABLET ORAL at 16:28

## 2022-05-26 RX ADMIN — METHYLPREDNISOLONE 32 MG: 4 TABLET ORAL at 21:27

## 2022-05-26 NOTE — ASSESSMENT & PLAN NOTE
· Managed on Coreg outpatient  · Pressures elevated in ED, have since improved, stable on home regimen

## 2022-05-26 NOTE — PLAN OF CARE
Problem: Neurological Deficit  Goal: Neurological status is stable or improving  Description: Interventions:  - Monitor and assess patient's level of consciousness, motor function, sensory function, and level of assistance needed for ADLs  - Monitor and report changes from baseline  Collaborate with interdisciplinary team to initiate plan and implement interventions as ordered  - Provide and maintain a safe environment  - Consider seizure precautions  - Consider fall precautions  - Consider aspiration precautions  - Consider bleeding precautions  Outcome: Progressing     Problem: Activity Intolerance/Impaired Mobility  Goal: Mobility/activity is maintained at optimum level for patient  Description: Interventions:  - Assess and monitor patient  barriers to mobility and need for assistive/adaptive devices  - Assess patient's emotional response to limitations  - Collaborate with interdisciplinary team and initiate plans and interventions as ordered  - Encourage independent activity per ability   - Maintain proper body alignment  - Perform active/passive rom as tolerated/ordered  - Plan activities to conserve energy   - Turn patient as appropriate  Outcome: Progressing     Problem: Communication Impairment  Goal: Ability to express needs and understand communication  Description: Assess patient's communication skills and ability to understand information  Patient will demonstrate use of effective communication techniques, alternative methods of communication and understanding even if not able to speak  - Encourage communication and provide alternate methods of communication as needed  - Collaborate with case management/ for discharge needs  - Include patient/family/caregiver in decisions related to communication    Outcome: Progressing

## 2022-05-26 NOTE — PLAN OF CARE
Problem: PAIN - ADULT  Goal: Verbalizes/displays adequate comfort level or baseline comfort level  Description: Interventions:  - Encourage patient to monitor pain and request assistance  - Assess pain using appropriate pain scale  - Administer analgesics based on type and severity of pain and evaluate response  - Implement non-pharmacological measures as appropriate and evaluate response  - Consider cultural and social influences on pain and pain management  - Notify physician/advanced practitioner if interventions unsuccessful or patient reports new pain  Outcome: Progressing     Problem: INFECTION - ADULT  Goal: Absence or prevention of progression during hospitalization  Description: INTERVENTIONS:  - Assess and monitor for signs and symptoms of infection  - Monitor lab/diagnostic results  - Monitor all insertion sites, i e  indwelling lines, tubes, and drains  - Monitor endotracheal if appropriate and nasal secretions for changes in amount and color  - [place] appropriate cooling/warming therapies per order  - Administer medications as ordered  - Instruct and encourage patient and family to use good hand hygiene technique  - Identify and instruct in appropriate isolation precautions for identified infection/condition  Outcome: Progressing  Goal: Absence of fever/infection during neutropenic period  Description: INTERVENTIONS:  - Monitor WBC    Outcome: Progressing     Problem: SAFETY ADULT  Goal: Patient will remain free of falls  Description: INTERVENTIONS:  - Educate patient/family on patient safety including physical limitations  - Instruct patient to call for assistance with activity   - Consult OT/PT to assist with strengthening/mobility   - Keep Call bell within reach  - Keep bed low and locked with side rails adjusted as appropriate  - Keep care items and personal belongings within reach  - Initiate and maintain comfort rounds  - Make Fall Risk Sign visible to staff  - Offer Toileting every 2 Hours, in advance of need    - Apply yellow socks and bracelet for high fall risk patients  - Consider moving patient to room near nurses station  Outcome: Progressing  Goal: Maintain or return to baseline ADL function  Description: INTERVENTIONS:  -  Assess patient's ability to carry out ADLs; assess patient's baseline for ADL function and identify physical deficits which impact ability to perform ADLs (bathing, care of mouth/teeth, toileting, grooming, dressing, etc )  - Assess/evaluate cause of self-care deficits   - Assess range of motion  - Assess patient's mobility; develop plan if impaired  - Assess patient's need for assistive devices and provide as appropriate  - Encourage maximum independence but intervene and supervise when necessary  - Involve family in performance of ADLs  - Assess for home care needs following discharge   - Consider OT consult to assist with ADL evaluation and planning for discharge  - Provide patient education as appropriate  Outcome: Progressing  Goal: Maintains/Returns to pre admission functional level  Description: INTERVENTIONS:  - Perform BMAT or MOVE assessment daily    - Set and communicate daily mobility goal to care team and patient/family/caregiver  - Collaborate with rehabilitation services on mobility goals if consulted  - Perform Range of Motion 2 times a day  - Reposition patient every 2 hours    - Dangle patient 2 times a day  - Stand patient 2 times a day  - Ambulate patient 2 times a day  - Out of bed to chair 2 times a day   - Out of bed for meals 2 times a day  - Out of bed for toileting  - Record patient progress and toleration of activity level   Outcome: Progressing     Problem: DISCHARGE PLANNING  Goal: Discharge to home or other facility with appropriate resources  Description: INTERVENTIONS:  - Identify barriers to discharge w/patient and caregiver  - Arrange for needed discharge resources and transportation as appropriate  - Identify discharge learning needs (meds, wound care, etc )  - Arrange for interpretive services to assist at discharge as needed  - Refer to Case Management Department for coordinating discharge planning if the patient needs post-hospital services based on physician/advanced practitioner order or complex needs related to functional status, cognitive ability, or social support system  Outcome: Progressing     Problem: Knowledge Deficit  Goal: Patient/family/caregiver demonstrates understanding of disease process, treatment plan, medications, and discharge instructions  Description: Complete learning assessment and assess knowledge base  Interventions:  - Provide teaching at level of understanding  - Provide teaching via preferred learning methods  Outcome: Progressing     Problem: Neurological Deficit  Goal: Neurological status is stable or improving  Description: Interventions:  - Monitor and assess patient's level of consciousness, motor function, sensory function, and level of assistance needed for ADLs  - Monitor and report changes from baseline  Collaborate with interdisciplinary team to initiate plan and implement interventions as ordered  - Provide and maintain a safe environment  - Consider seizure precautions  - Consider fall precautions  - Consider aspiration precautions  - Consider bleeding precautions  Outcome: Progressing     Problem: Activity Intolerance/Impaired Mobility  Goal: Mobility/activity is maintained at optimum level for patient  Description: Interventions:  - Assess and monitor patient  barriers to mobility and need for assistive/adaptive devices  - Assess patient's emotional response to limitations  - Collaborate with interdisciplinary team and initiate plans and interventions as ordered  - Encourage independent activity per ability   - Maintain proper body alignment  - Perform active/passive rom as tolerated/ordered    - Plan activities to conserve energy   - Turn patient as appropriate  Outcome: Progressing Problem: Communication Impairment  Goal: Ability to express needs and understand communication  Description: Assess patient's communication skills and ability to understand information  Patient will demonstrate use of effective communication techniques, alternative methods of communication and understanding even if not able to speak  - Encourage communication and provide alternate methods of communication as needed  - Collaborate with case management/ for discharge needs  - Include patient/family/caregiver in decisions related to communication  Outcome: Progressing     Problem: Nutrition  Goal: Nutrition/Hydration status is improving  Description: Monitor and assess patient's nutrition/hydration status for malnutrition (ex- brittle hair, bruises, dry skin, pale skin and conjunctiva, muscle wasting, smooth red tongue, and disorientation)  Collaborate with interdisciplinary team and initiate plan and interventions as ordered  Monitor patient's weight and dietary intake as ordered or per policy  Utilize nutrition screening tool and intervene per policy  Determine patient's food preferences and provide high-protein, high-caloric foods as appropriate  - Assist patient with eating   - Allow adequate time for meals   - Encourage patient to take dietary supplement as ordered  - Collaborate with clinical nutritionist   - Include patient/family/caregiver in decisions related to nutrition    Outcome: Progressing     Problem: CARDIOVASCULAR - ADULT  Goal: Maintains optimal cardiac output and hemodynamic stability  Description: INTERVENTIONS:  - Monitor I/O, vital signs and rhythm  - Monitor for S/S and trends of decreased cardiac output  - Administer and titrate ordered vasoactive medications to optimize hemodynamic stability  - Assess quality of pulses, skin color and temperature  - Assess for signs of decreased coronary artery perfusion  - Instruct patient to report change in severity of symptoms  Outcome: Progressing     Problem: Potential for Falls  Goal: Patient will remain free of falls  Description: INTERVENTIONS:  - Educate patient/family on patient safety including physical limitations  - Instruct patient to call for assistance with activity   - Consult OT/PT to assist with strengthening/mobility   - Keep Call bell within reach  - Keep bed low and locked with side rails adjusted as appropriate  - Keep care items and personal belongings within reach  - Initiate and maintain comfort rounds  - Make Fall Risk Sign visible to staff  - Offer Toileting every 2 Hours, in advance of need    - Apply yellow socks and bracelet for high fall risk patients  - Consider moving patient to room near nurses station  Outcome: Progressing

## 2022-05-26 NOTE — PHYSICAL THERAPY NOTE
PHYSICAL THERAPY Evaluation    Performed at least 2 patient identifiers during session:  Patient Active Problem List   Diagnosis    Vertigo    IPMN (intraductal papillary mucinous neoplasm)    Dizziness    Hypertension       Past Medical History:   Diagnosis Date    Hypertension     Throat cancer [facility])            Past Surgical History:   Procedure Laterality Date    NECK DISSECTION Right     1993    PANCREATICODUODENECTOMY      2017        22 0748   PT Last Visit   PT Visit Date 22   Note Type   Note type Evaluation   Pain Assessment   Pain Assessment Tool 0-10   Pain Score No Pain   Home Living   Type of [address] [facility] One level  (2 YIFAN)   Prior Function   Level of [place] Independent with ADLs and functional mobility   Lives With Alone   Receives Help From Family   ADL Assistance Independent   IADLs Independent   General   Additional Pertinent History (S)  pt denies any room spinning dizziness, denies light headedness;  Orthostatic BP measurements:  Supine:  158/84mmHg, sittin/82mmHg, standing 112/76mmHg  Given (+) Basilar Tip Aneurysm, HTN and resolution of symptoms, Cliff-Hallpike not indicated at this time  Discussed with pt and PA     Family/Caregiver Present No   Cognition   Overall Cognitive Status WFL   Arousal/Participation Alert   Orientation Level Oriented X4   Memory Within functional limits   Following Commands Follows all commands and directions without difficulty   Subjective   Subjective pt states he feels better, back to baseline   RUE Assessment   RUE Assessment WFL   LUE Assessment   LUE Assessment WFL   RLE Assessment   RLE Assessment WFL   LLE Assessment   LLE Assessment WFL   Vestibular   Spontaneous Nystagmus (-) no evidence of nystagmus at rest in room light   Gaze Holding Nystagmus (-) no evidence of nystagmus   Vestibular Comments smooth pursuits and saccades [facility] bilaterally   Bed Mobility   Supine to Sit 7  Independent   Sit to Supine 7  Independent   Transfers   Sit to Stand 7  Independent   Stand to Sit 7  Independent   Ambulation/Elevation   Gait pattern WNL   Gait Assistance 7  Independent   Assistive Device None   Distance 150ft, no LOB or unsteadiness, safe peter  Balance   Static Sitting Normal   Dynamic Sitting Normal   Static Standing Normal   Dynamic Standing Normal   Ambulatory Normal   Assessment   Prognosis Good   Assessment Pt is a 68 y o  male who presented to ED 5/25/22 with c/o dizziness when laying down  Dx:  CVA vs vertigo; /100, CTA (+) Basilar tip aneurysm  Comorbidities affecting pt's physical performance at time of assessment include: HTN, throat Ca  PLOF and home set up listed above; Upon evaluation: Pt independent for bed mobility, independent for sit to stand, and independent for ambulation without AD  Full objective findings from PT assessment regarding body systems outlined above  The following objective measures performed on IE also reveal limitations: HTN, basilar tip aneurysm therefore Cliff-Hallpike is contraindicated  However, eye movement with head stabilized for saccades and smooth pursuits WFL bilaterally  Pt's clinical presentation is currently unstable/unpredictable seen in pt's presentation of continuous monitoring in hospital, BP  Pt appears to be functioning at baseline for mobility, denies any room-spinning dizziness or light headedness even with drop in BP when standing  PA aware of findings  Will DC PT, no need for skilled PT, safe for DC home when medically ready;  Encourage ambulation TID and OOB for all meals  The patient's AM-PAC Basic Mobility Inpatient Short Form Raw Score is 24  A Raw score of greater than 17 suggests the patient may benefit from discharge to home  Please also refer to the recommendation of the Physical Therapist for safe discharge planning     Goals   Patient Goals to go home and mow my yard Recommendation   PT Discharge Recommendation No rehabilitation needs   AM-PAC Basic Mobility Inpatient   Turning in Bed Without Bedrails 4   Lying on Back to Sitting on Edge of Flat Bed 4   Moving Bed to Chair 4   Standing Up From Chair 4   Walk in Room 4   Climb 3-5 Stairs 4   Basic Mobility Inpatient Raw Score 24   Basic Mobility Standardized Score 57 68   Highest Level Of Mobility   JH-HLM Goal 8: Walk 250 feet or more   JH-HLM Achieved 7: Walk 25 feet or more     Diandra Sy, CHAZ      Patient Name: Adrianne Martinez  QHGFG'T Date: 5/26/2022

## 2022-05-26 NOTE — PROGRESS NOTES
[place]  Progress Note Mattie Galvez 1945, 68 y o  male MRN: [id number]  Unit/Bed#: -Bong Encounter: [id number]  Primary Care Provider: Laquita Drake   Date and time admitted to hospital: 5/25/2022  7:34 AM    * Dizziness  Assessment & Plan  · CT scan negative   MRI ordered, pending   CTA head/neck: 3 mm basilar tip aneurysm is present  Recommend follow-up consultation with the [facility], a division of [facility] for Neuroscience   o Cliff Finneyke/Epley procedure deferred due to this per PT/OT   ECHO pending   Admission NIHSS 0   Stroke pathway   Permissive hypertension not indicated per Neurology   DVT prophylaxis    PT/OT/ST   ASA   Statin   Lipid panel/Hgb A1C ordered   Rodrick Avelar Neurology consult; recommendations appreciated   Meclizine prn   Patient symptoms have resolved, now at baseline with no further dizziness      Hypertension  Assessment & Plan  · Managed on [facility] outpatient  · Pressures elevated in ED, have since improved, stable on home regimen    IPMN (intraductal papillary mucinous neoplasm)  Assessment & Plan  · September 15, 2017 he underwent an exploratory laparotomy, open cholecystectomy and pylorus preserving pancreatico duodenectomy for a serous cystadenoma  Vertigo  Assessment & Plan  · Continue meclizine prn      VTE Pharmacologic Prophylaxis: VTE Score: 3 Moderate Risk (Score 3-4) - Pharmacological DVT Prophylaxis Ordered: enoxaparin (Lovenox)  Patient Centered Rounds: I performed bedside rounds with nursing staff today  Discussions with Specialists or Other Care Team Provider:  Discussed with Neurology, await MRI results  Discussed with PT, patient not require rehab on discharge, okay to return home with outpatient vestibular rehab    Education and Discussions with Family / Patient: Patient declined call to   Time Spent for Care: 30 minutes   More than 50% of total time spent on counseling and coordination of care as described above  Current Length of Stay: 0 day(s)  Current Patient Status: Observation   Certification Statement: The patient will continue to require additional inpatient hospital stay due to Pending MRI  Discharge Plan: Anticipate discharge in 24-48 hrs to home  Code Status: Level 1 - Full Code    Subjective:   Patient reports he feels well, no further episodes of dizziness, he still is having some neck stiffness but has not taken his muscle relaxer, encouraged him to request this  Otherwise, offers no complaints and is very eager for discharge home  Objective:     Vitals:   Temp (24hrs), Av 8 °F (36 6 °C), Min:97 6 °F (36 4 °C), Max:97 9 °F (36 6 °C)    Temp:  [97 6 °F (36 4 °C)-97 9 °F (36 6 °C)] 97 8 °F (36 6 °C)  HR:  [61-79] 79  Resp:  [18-19] 19  BP: (104-158)/(70-84) 112/76  SpO2:  [91 %-98 %] 91 %  Body mass index is 24 12 kg/m²  Input and Output Summary (last 24 hours):   No intake or output data in the 24 hours ending 22 0956    Physical Exam:   Physical Exam  Vitals and nursing note reviewed  Constitutional:       General: He is not in acute distress  Appearance: Normal appearance  He is well-developed  HENT:      Head: Normocephalic and atraumatic  Eyes:      General: No scleral icterus  Conjunctiva/sclera: Conjunctivae normal    Cardiovascular:      Rate and Rhythm: Normal rate and regular rhythm  Heart sounds: No murmur heard  Pulmonary:      Effort: Pulmonary effort is normal       Breath sounds: No wheezing, rhonchi or rales  Abdominal:      General: There is no distension  Palpations: Abdomen is soft  Skin:     General: Skin is warm and dry  Neurological:      General: No focal deficit present  Mental Status: He is alert     Psychiatric:         Mood and Affect: Mood normal        Additional Data:     Labs:  Results from last 7 days   Lab Units 22  0436   WBC Thousand/uL 5 75   HEMOGLOBIN g/dL 15 9 HEMATOCRIT % 47 2   PLATELETS Thousands/uL 177   NEUTROS PCT % 56   LYMPHS PCT % 24   MONOS PCT % 15*   EOS PCT % 3     Results from last 7 days   Lab Units 05/26/22  0436   SODIUM mmol/L 141   POTASSIUM mmol/L 4 5   CHLORIDE mmol/L 107   CO2 mmol/L 31   BUN mg/dL 17   CREATININE mg/dL 1 35*   ANION GAP mmol/L 3*   CALCIUM mg/dL 8 3   ALBUMIN g/dL 3 1*   TOTAL BILIRUBIN mg/dL 0 80   ALK PHOS U/L 80   ALT U/L 19   AST U/L 21   GLUCOSE RANDOM mg/dL 104     Results from last 7 days   Lab Units 05/25/22  0824   INR  0 98     Results from last 7 days   Lab Units 05/25/22  0826   POC GLUCOSE mg/dl 129               Lines/Drains:  Invasive Devices  Report    Peripheral Intravenous Line  Duration           Peripheral IV 05/25/22 Left Antecubital 1 day                  Telemetry:  Telemetry Orders (From admission, onward)             48 Hour Telemetry Monitoring  (ED Bridging Orders Panel)  Continuous x 48 hours        References:    Telemetry Guidelines   Question:  Reason for 48 Hour Telemetry  Answer:  Acute CVA (<24 hrs old, hemispheric strokes, selected brainstem strokes, cardiac arrhythmias)                 Telemetry Reviewed: Normal Sinus Rhythm  Indication for Continued Telemetry Use: No indication for continued use  Will discontinue  Imaging: No pertinent imaging reviewed      Recent Cultures (last 7 days):         Last 24 Hours Medication List:   Current Facility-Administered Medications   Medication Dose Route Frequency Provider Last Rate    aspirin  81 mg Oral Daily [address] ASHLEY GUEVARA      carvedilol  12 5 mg Oral BID With Meals [address] [facility] ASHLEY GUEVARA      enoxaparin  40 mg Subcutaneous Daily [address] [facility] ASHLEY GUEVARA      meclizine  25 mg Oral Q8H PRN Kristi Mendieta PA-C      methocarbamol  500 mg Oral Q6H PRN Lilliam GUEVARA PA-C      Pancrelipase (Lip-Prot-Amyl)  1 capsule Oral TID With Meals Lilliam GUEVARA PA-C      pravastatin  40 mg Oral Daily With [employer]OM[employer] ASHLEY GUEVARA Today, Patient Was Seen By: Thierno Quispe PA-C    **Please Note: This note may have been constructed using a voice recognition system  **

## 2022-05-26 NOTE — ASSESSMENT & PLAN NOTE
· CT scan negative   MRI ordered, pending   CTA head/neck: 3 mm basilar tip aneurysm is present   Recommend follow-up consultation with the [facility], a [facility]   o Cliff Avalos/Epley procedure deferred due to this per PT/OT   ECHO pending   Admission NIHSS 0   Stroke pathway   Permissive hypertension not indicated per Neurology   DVT prophylaxis    PT/OT/ST   ASA   Statin   Lipid panel/Hgb A1C ordered   [employer] Neurology consult; recommendations appreciated   Meclizine prn   Patient symptoms have resolved, now at baseline with no further dizziness

## 2022-05-26 NOTE — OCCUPATIONAL THERAPY NOTE
Occupational Therapy Evaluation     Patient Name: Lynn Urena  [id number] Date: 5/26/2022  Problem List  Principal Problem:    Dizziness  Active Problems:    Vertigo    IPMN (intraductal papillary mucinous neoplasm)    Hypertension    Past Medical History  Past Medical History:   Diagnosis Date    Hypertension     Throat cancer ([address] )     1992     Past Surgical History  Past Surgical History:   Procedure Laterality Date    NECK DISSECTION Right     1993    PANCREATICODUODENECTOMY      2017             05/26/22 0925   OT Last Visit   OT Visit Date 05/26/22   Note Type   Note type Evaluation   Pain Assessment   Pain Assessment Tool 0-10   Pain Score 3   Pain Location/Orientation Location: Neck  (Chronic)   Home Living   Type of [address] [place] [address] One level;Stairs to enter without rails  (2STE)   Bathroom Shower/Tub Tub/shower unit   Bathroom Toilet Raised   Bathroom Equipment Grab bars in [facility];Reacher  (Occasionally uses RW if LBP is high, typically no device)   Prior Function   Level of [place] Independent with ADLs and functional mobility   Lives With Alone   Receives Help From Family   ADL Assistance Independent   IADLs Independent   Falls in the last 6 months 0   Vocational Retired   Lifestyle   Autonomy Pt reports being independent in ADL/IADLs, mows, (+) drives, cooks & cleans   Reciprocal Relationships Two sons live 15 min away   Service to Others Retired, worked at [employer] 2 mornings a week   Psychosocial   Psychosocial (WDL) [address] "I feel good!"   ADL   Eating Assistance 7  Independent   Grooming Assistance 7  Independent   UB Bathing Assistance 7  Independent   LB Bathing Assistance 7  Independent   UB Dressing Assistance 7  Independent   LB Dressing Assistance 7  Independent   Toileting Assistance  7  Independent   Bed Mobility   Supine to Sit 7  Independent   Sit to Supine 7  Independent   Transfers Sit to Stand 7  Independent   Stand to Sit 7  Independent   Additional Comments No c/o dizziness/vertigo, VSS throughout   Functional Mobility   Functional Mobility 7  Independent   Balance   Static Sitting Normal   Dynamic Sitting Normal   Static Standing Normal   Dynamic Standing Normal   Ambulatory Normal   Activity Tolerance   Activity Tolerance Patient tolerated treatment well   Nurse Made Aware RN Read Yariel   RUE Assessment   RUE Assessment WFL   LUE Assessment   LUE Assessment WFL   Hand Function   Gross Motor Coordination Functional   Fine Motor Coordination Functional   Sensation   Light Touch No apparent deficits   Vision-Basic Assessment   Current Vision Wears glasses only for reading   Vision - Complex Assessment   Ocular Range of Motion WFL   Head Position WDL   Tracking Able to track stimulus in all quads without difficulty   Saccades   (No nystagmus noted)   Cognition   Overall Cognitive Status WFL   Arousal/Participation Alert   Attention Within functional limits   Orientation Level Oriented X4   Memory Within functional limits   Following Commands Follows all commands and directions without difficulty   Assessment   Prognosis Good   Assessment Pt is a 68 y o  male seen for OT evaluation at [facility], admitted 5/25/2022 w/ Dizziness  OT completed brief review of pt's medical and social history  Comorbidities affecting pt's functional performance at time of assessment include: vertigo, IPMN, HTN  Prior to admission, pt was living alone in a [facility] with 2STE and was independent in ADL/IADLs, (+) drove, & required no use of DME  Upon evaluation, pt presents to OT at functional baseline  Based on findings, pt is of low complexity  The patient's raw score on the AM-PAC Daily Activity inpatient short form is 24, standardized score is 57 54, greater than 39 4  Patients at this level are likely to benefit from DC to home   Please refer to the recommendation of the Occupational Therapist for safe DC planning  At this time, OT recommendations at time of discharge are no rehab needs  No further acute OT needs indicated at this time - Recommend pt continue to be OOB for meals, ambulation to/from BR, perform self care tasks, and mobility in hallway with nursing  D/C from OT caseload with above recommendations     Goals   Patient Goals Pt wants to go home   Plan   OT Frequency Eval only   Recommendation   OT Discharge Recommendation No rehabilitation needs   AM-PAC Daily Activity Inpatient   Lower Body Dressing 4   Bathing 4   Toileting 4   Upper Body Dressing 4   Grooming 4   Eating 4   Daily Activity Raw Score 24   Daily Activity Standardized Score (Calc for Raw Score >=11) 57 54   AM-PAC Applied Cognition Inpatient   Following a Speech/Presentation 4   Understanding Ordinary Conversation 4   Taking Medications 4   Remembering Where Things Are Placed or Put Away 4   Remembering List of 4-5 Errands 4   Taking Care of Complicated Tasks 4   Applied Cognition Raw Score 24   Applied Cognition Standardized Score 62 21     Francisco Rico, OTR/L

## 2022-05-27 ENCOUNTER — APPOINTMENT (INPATIENT)
Dept: MRI IMAGING | Facility: HOSPITAL | Age: 77
DRG: 149 | End: 2022-05-27
Payer: MEDICARE

## 2022-05-27 ENCOUNTER — APPOINTMENT (INPATIENT)
Dept: NON INVASIVE DIAGNOSTICS | Facility: HOSPITAL | Age: 77
DRG: 149 | End: 2022-05-27
Payer: MEDICARE

## 2022-05-27 VITALS
HEIGHT: 76 IN | HEART RATE: 83 BPM | WEIGHT: 198.19 LBS | DIASTOLIC BLOOD PRESSURE: 82 MMHG | RESPIRATION RATE: 18 BRPM | TEMPERATURE: 97.6 F | BODY MASS INDEX: 24.13 KG/M2 | SYSTOLIC BLOOD PRESSURE: 141 MMHG | OXYGEN SATURATION: 90 %

## 2022-05-27 LAB
ALBUMIN SERPL BCP-MCNC: 3.4 G/DL (ref 3.5–5)
ALP SERPL-CCNC: 82 U/L (ref 46–116)
ALT SERPL W P-5'-P-CCNC: 23 U/L (ref 12–78)
ANION GAP SERPL CALCULATED.3IONS-SCNC: 9 MMOL/L (ref 4–13)
AST SERPL W P-5'-P-CCNC: 21 U/L (ref 5–45)
ATRIAL RATE: 57 BPM
BASOPHILS # BLD AUTO: 0.03 THOUSANDS/ΜL (ref 0–0.1)
BASOPHILS NFR BLD AUTO: 0 % (ref 0–1)
BILIRUB SERPL-MCNC: 0.7 MG/DL (ref 0.2–1)
BUN SERPL-MCNC: 23 MG/DL (ref 5–25)
CALCIUM ALBUM COR SERPL-MCNC: 9 MG/DL (ref 8.3–10.1)
CALCIUM SERPL-MCNC: 8.5 MG/DL (ref 8.3–10.1)
CHLORIDE SERPL-SCNC: 106 MMOL/L (ref 100–108)
CO2 SERPL-SCNC: 21 MMOL/L (ref 21–32)
CREAT SERPL-MCNC: 1.32 MG/DL (ref 0.6–1.3)
EOSINOPHIL # BLD AUTO: 0.01 THOUSAND/ΜL (ref 0–0.61)
EOSINOPHIL NFR BLD AUTO: 0 % (ref 0–6)
ERYTHROCYTE [DISTWIDTH] IN BLOOD BY AUTOMATED COUNT: 11.4 % (ref 11.6–15.1)
GFR SERPL CREATININE-BSD FRML MDRD: 52 ML/MIN/1.73SQ M
GLUCOSE SERPL-MCNC: 150 MG/DL (ref 65–140)
HCT VFR BLD AUTO: 48 % (ref 36.5–49.3)
HGB BLD-MCNC: 16.9 G/DL (ref 12–17)
IMM GRANULOCYTES # BLD AUTO: 0.07 THOUSAND/UL (ref 0–0.2)
IMM GRANULOCYTES NFR BLD AUTO: 1 % (ref 0–2)
LYMPHOCYTES # BLD AUTO: 0.96 THOUSANDS/ΜL (ref 0.6–4.47)
LYMPHOCYTES NFR BLD AUTO: 10 % (ref 14–44)
MCH RBC QN AUTO: 33.3 PG (ref 26.8–34.3)
MCHC RBC AUTO-ENTMCNC: 35.2 G/DL (ref 31.4–37.4)
MCV RBC AUTO: 95 FL (ref 82–98)
MONOCYTES # BLD AUTO: 0.21 THOUSAND/ΜL (ref 0.17–1.22)
MONOCYTES NFR BLD AUTO: 2 % (ref 4–12)
NEUTROPHILS # BLD AUTO: 8.86 THOUSANDS/ΜL (ref 1.85–7.62)
NEUTS SEG NFR BLD AUTO: 87 % (ref 43–75)
NRBC BLD AUTO-RTO: 0 /100 WBCS
P AXIS: 54 DEGREES
PLATELET # BLD AUTO: 189 THOUSANDS/UL (ref 149–390)
PMV BLD AUTO: 10.4 FL (ref 8.9–12.7)
POTASSIUM SERPL-SCNC: 4.6 MMOL/L (ref 3.5–5.3)
PR INTERVAL: 178 MS
PROT SERPL-MCNC: 6.9 G/DL (ref 6.4–8.2)
QRS AXIS: 58 DEGREES
QRSD INTERVAL: 128 MS
QT INTERVAL: 434 MS
QTC INTERVAL: 422 MS
RBC # BLD AUTO: 5.08 MILLION/UL (ref 3.88–5.62)
SODIUM SERPL-SCNC: 136 MMOL/L (ref 136–145)
T WAVE AXIS: 51 DEGREES
VENTRICULAR RATE: 57 BPM
WBC # BLD AUTO: 10.14 THOUSAND/UL (ref 4.31–10.16)

## 2022-05-27 PROCEDURE — 80053 COMPREHEN METABOLIC PANEL: CPT | Performed by: INTERNAL MEDICINE

## 2022-05-27 PROCEDURE — 93010 ELECTROCARDIOGRAM REPORT: CPT | Performed by: INTERNAL MEDICINE

## 2022-05-27 PROCEDURE — 70553 MRI BRAIN STEM W/O & W/DYE: CPT

## 2022-05-27 PROCEDURE — G1004 CDSM NDSC: HCPCS

## 2022-05-27 PROCEDURE — 99239 HOSP IP/OBS DSCHRG MGMT >30: CPT | Performed by: PHYSICIAN ASSISTANT

## 2022-05-27 PROCEDURE — 85025 COMPLETE CBC W/AUTO DIFF WBC: CPT | Performed by: INTERNAL MEDICINE

## 2022-05-27 PROCEDURE — A9575 INJ GADOTERATE MEGLUMI 0.1ML: HCPCS | Performed by: PHYSICIAN ASSISTANT

## 2022-05-27 RX ORDER — MECLIZINE HYDROCHLORIDE 25 MG/1
25 TABLET ORAL EVERY 8 HOURS PRN
Qty: 30 TABLET | Refills: 0 | Status: SHIPPED | OUTPATIENT
Start: 2022-05-27

## 2022-05-27 RX ADMIN — CARVEDILOL 12.5 MG: 12.5 TABLET, FILM COATED ORAL at 08:46

## 2022-05-27 RX ADMIN — ENOXAPARIN SODIUM 40 MG: 100 INJECTION SUBCUTANEOUS at 12:21

## 2022-05-27 RX ADMIN — GADOTERATE MEGLUMINE 18 ML: 376.9 INJECTION INTRAVENOUS at 10:42

## 2022-05-27 RX ADMIN — METHYLPREDNISOLONE 32 MG: 4 TABLET ORAL at 07:26

## 2022-05-27 RX ADMIN — ASPIRIN 81 MG CHEWABLE TABLET 81 MG: 81 TABLET CHEWABLE at 08:46

## 2022-05-27 NOTE — INCIDENTAL FINDINGS
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

## 2022-05-27 NOTE — ASSESSMENT & PLAN NOTE
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, likely chronic   CTA head/neck: 3 mm basilar tip aneurysm is present  Recommend follow-up consultation with the [facility], a division of [address] [place] [address] for Neuroscience   o Cliff Avalos/Epley procedure deferred due to this per PT/OT   ECHO pending at discharge   Admission NIHSS 0   Placed on stroke pathway  MRI is negative for acute ischemia   Continue Statin   Lipid panel/Hgb A1C acceptable   Neurology consult; will need followup CTA in 4 weeks for right IJ thrombus     Meclizine prn   Patient symptoms have resolved, now at baseline with no further dizziness

## 2022-05-27 NOTE — CASE MANAGEMENT
Case Management Discharge Planning Note    Patient name Calixto Tee  Location /-22 MRN [id number]  : 1945 Date 2022       Current Admission Date: 2022  Current Admission Diagnosis:Dizziness   Patient Active Problem List    Diagnosis Date Noted    Vertigo 2022    Dizziness 2022    Hypertension 2022    IPMN (intraductal papillary mucinous neoplasm) 2017      LOS (days): 1  Geometric Mean LOS (GMLOS) (days): 2 00  Days to GMLOS:1 3     OBJECTIVE:  Risk of Unplanned Readmission Score: 5 84         Current admission status: Inpatient   Preferred Pharmacy:   [facility] [address], [address] [facility]  [address] [facility]  [facility] [address]  Phone: [phone] Fax: [phone]    Primary Care Provider: Adriano Schmid    Primary Insurance: MEDICARE  [facility]    DISCHARGE DETAILS:     IMM Given (Date):: 22 (copy provided to patient and media)  IMM Given to[de-identified] Patient

## 2022-05-27 NOTE — DISCHARGE INSTR - AVS FIRST PAGE
Follow-up with neurology for repeat CTA head/neck in 4 weeks for your right internal jugular vein thrombus  Follow-up with neurosurgery within the next 3 months for your 3 mm basilar artery aneurysm  Continue taking atorvastatin every day

## 2022-05-27 NOTE — PLAN OF CARE
Problem: PAIN - ADULT  Goal: Verbalizes/displays adequate comfort level or baseline comfort level  Description: Interventions:  - Encourage patient to monitor pain and request assistance  - Assess pain using appropriate pain scale  - Administer analgesics based on type and severity of pain and evaluate response  - Implement non-pharmacological measures as appropriate and evaluate response  - Consider cultural and social influences on pain and pain management  - Notify physician/advanced practitioner if interventions unsuccessful or patient reports new pain  Outcome: Progressing     Problem: INFECTION - ADULT  Goal: Absence or prevention of progression during hospitalization  Description: INTERVENTIONS:  - Assess and monitor for signs and symptoms of infection  - Monitor lab/diagnostic results  - Monitor all insertion sites, i e  indwelling lines, tubes, and drains  - Monitor endotracheal if appropriate and nasal secretions for changes in amount and color  - [place] appropriate cooling/warming therapies per order  - Administer medications as ordered  - Instruct and encourage patient and family to use good hand hygiene technique  - Identify and instruct in appropriate isolation precautions for identified infection/condition  Outcome: Progressing  Goal: Absence of fever/infection during neutropenic period  Description: INTERVENTIONS:  - Monitor WBC    Outcome: Progressing     Problem: SAFETY ADULT  Goal: Patient will remain free of falls  Description: INTERVENTIONS:  - Educate patient/family on patient safety including physical limitations  - Instruct patient to call for assistance with activity   - Consult OT/PT to assist with strengthening/mobility   - Keep Call bell within reach  - Keep bed low and locked with side rails adjusted as appropriate  - Keep care items and personal belongings within reach  - Initiate and maintain comfort rounds  - Make Fall Risk Sign visible to staff  - Offer Toileting every 2 Hours, in advance of need    - Apply yellow socks and bracelet for high fall risk patients  - Consider moving patient to room near nurses station  Outcome: Progressing  Goal: Maintain or return to baseline ADL function  Description: INTERVENTIONS:  -  Assess patient's ability to carry out ADLs; assess patient's baseline for ADL function and identify physical deficits which impact ability to perform ADLs (bathing, care of mouth/teeth, toileting, grooming, dressing, etc )  - Assess/evaluate cause of self-care deficits   - Assess range of motion  - Assess patient's mobility; develop plan if impaired  - Assess patient's need for assistive devices and provide as appropriate  - Encourage maximum independence but intervene and supervise when necessary  - Involve family in performance of ADLs  - Assess for home care needs following discharge   - Consider OT consult to assist with ADL evaluation and planning for discharge  - Provide patient education as appropriate  Outcome: Progressing  Goal: Maintains/Returns to pre admission functional level  Description: INTERVENTIONS:  - Perform BMAT or MOVE assessment daily    - Set and communicate daily mobility goal to care team and patient/family/caregiver  - Collaborate with rehabilitation services on mobility goals if consulted  - Perform Range of Motion 2 times a day  - Reposition patient every 2 hours    - Dangle patient 2 times a day  - Stand patient 2 times a day  - Ambulate patient 2 times a day  - Out of bed to chair 2 times a day   - Out of bed for meals 2 times a day  - Out of bed for toileting  - Record patient progress and toleration of activity level   Outcome: Progressing     Problem: DISCHARGE PLANNING  Goal: Discharge to home or other facility with appropriate resources  Description: INTERVENTIONS:  - Identify barriers to discharge w/patient and caregiver  - Arrange for needed discharge resources and transportation as appropriate  - Identify discharge learning needs (meds, wound care, etc )  - Arrange for interpretive services to assist at discharge as needed  - Refer to Case Management Department for coordinating discharge planning if the patient needs post-hospital services based on physician/advanced practitioner order or complex needs related to functional status, cognitive ability, or social support system  Outcome: Progressing     Problem: Knowledge Deficit  Goal: Patient/family/caregiver demonstrates understanding of disease process, treatment plan, medications, and discharge instructions  Description: Complete learning assessment and assess knowledge base  Interventions:  - Provide teaching at level of understanding  - Provide teaching via preferred learning methods  Outcome: Progressing     Problem: Neurological Deficit  Goal: Neurological status is stable or improving  Description: Interventions:  - Monitor and assess patient's level of consciousness, motor function, sensory function, and level of assistance needed for ADLs  - Monitor and report changes from baseline  Collaborate with interdisciplinary team to initiate plan and implement interventions as ordered  - Provide and maintain a safe environment  - Consider seizure precautions  - Consider fall precautions  - Consider aspiration precautions  - Consider bleeding precautions  Outcome: Progressing     Problem: Activity Intolerance/Impaired Mobility  Goal: Mobility/activity is maintained at optimum level for patient  Description: Interventions:  - Assess and monitor patient  barriers to mobility and need for assistive/adaptive devices  - Assess patient's emotional response to limitations  - Collaborate with interdisciplinary team and initiate plans and interventions as ordered  - Encourage independent activity per ability   - Maintain proper body alignment  - Perform active/passive rom as tolerated/ordered    - Plan activities to conserve energy   - Turn patient as appropriate  Outcome: Progressing Problem: Communication Impairment  Goal: Ability to express needs and understand communication  Description: Assess patient's communication skills and ability to understand information  Patient will demonstrate use of effective communication techniques, alternative methods of communication and understanding even if not able to speak  - Encourage communication and provide alternate methods of communication as needed  - Collaborate with case management/ for discharge needs  - Include patient/family/caregiver in decisions related to communication  Outcome: Progressing     Problem: Nutrition  Goal: Nutrition/Hydration status is improving  Description: Monitor and assess patient's nutrition/hydration status for malnutrition (ex- brittle hair, bruises, dry skin, pale skin and conjunctiva, muscle wasting, smooth red tongue, and disorientation)  Collaborate with interdisciplinary team and initiate plan and interventions as ordered  Monitor patient's weight and dietary intake as ordered or per policy  Utilize nutrition screening tool and intervene per policy  Determine patient's food preferences and provide high-protein, high-caloric foods as appropriate  - Assist patient with eating   - Allow adequate time for meals   - Encourage patient to take dietary supplement as ordered  - Collaborate with clinical nutritionist   - Include patient/family/caregiver in decisions related to nutrition    Outcome: Progressing     Problem: CARDIOVASCULAR - ADULT  Goal: Maintains optimal cardiac output and hemodynamic stability  Description: INTERVENTIONS:  - Monitor I/O, vital signs and rhythm  - Monitor for S/S and trends of decreased cardiac output  - Administer and titrate ordered vasoactive medications to optimize hemodynamic stability  - Assess quality of pulses, skin color and temperature  - Assess for signs of decreased coronary artery perfusion  - Instruct patient to report change in severity of symptoms  Outcome: Progressing     Problem: Potential for Falls  Goal: Patient will remain free of falls  Description: INTERVENTIONS:  - Educate patient/family on patient safety including physical limitations  - Instruct patient to call for assistance with activity   - Consult OT/PT to assist with strengthening/mobility   - Keep Call bell within reach  - Keep bed low and locked with side rails adjusted as appropriate  - Keep care items and personal belongings within reach  - Initiate and maintain comfort rounds  - Make Fall Risk Sign visible to staff  - Offer Toileting every 2 Hours, in advance of need    - Apply yellow socks and bracelet for high fall risk patients  - Consider moving patient to room near nurses station  Outcome: Progressing

## 2022-05-27 NOTE — DISCHARGE SUMMARY
[place]  Discharge- Mya [place] 1945, 68 y o  male MRN: [id number]  Unit/Bed#: -Bong Encounter: [id number]  Primary Care Provider: Crista Drain   Date and time admitted to hospital: 5/25/2022  7:34 AM    Hypertension  Assessment & Plan  · Managed on [facility] outpatient  · Pressures elevated in ED, have since improved, stable on home regimen    IPMN (intraductal papillary mucinous neoplasm)  Assessment & Plan  · September 15, 2017 he underwent an exploratory laparotomy, open cholecystectomy and pylorus preserving pancreatico duodenectomy for a serous cystadenoma  Vertigo  Assessment & Plan  · Continue meclizine prn    * Dizziness  Assessment & Plan  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 for Neuroscience   o Cliff Finneyke/Epley procedure deferred due to this per PT/OT   ECHO pending at discharge   Admission NIHSS 0   Placed on stroke pathway  MRI is negative for acute ischemia   Continue Statin   Lipid panel/Hgb A1C acceptable   Neurology consult; will need followup CTA in 4 weeks for right IJ thrombus     Meclizine prn   Patient symptoms have resolved, now at baseline with no further dizziness          Medical Problems             Resolved Problems  Date Reviewed: 5/27/2022   None               Discharging Physician / Practitioner: Nieves Moon PA-C  PCP: Era Drain  Admission Date:   Admission Orders (From admission, onward)     Ordered        05/26/22 1628  Inpatient Admission  Once            05/25/22 0920  Place in Observation  Once                      Discharge Date: 05/27/22    Consultations During [facility] Stay:  · Neurology    Procedures Performed:   · CT head  · CTA head/neck  · MRI brain    Significant Findings / Test Results:   · MRI brain-no acute ischemia  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, likely chronic  Incidental Findings:   · CTA head/neck-  3 mm basilar tip aneurysm is present  Recommend follow-up consultation with the [facility], a division of [address] [place] [address] Neuroscience at [phone]  Test Results Pending at Discharge (will require follow up): · Echocardiogram     Outpatient Tests Requested:  · CTA head/neck with Neurology outpatient    Complications:  None    Reason for Admission:  Dizziness    Hospital Course:   Calixto Tee is a 68 y o  male patient who originally presented to the hospital on 5/25/2022 due to dizziness the night prior to admission, as well as nausea vomiting  Worse with  positional changes  Symptoms had resolved prior to arriving to the ED  Patient was evaluated by Neurology and was admitted under the stroke pathway  Underwent imaging with CT head which was negative, CTA head/neck which demonstrated right IJ occlusion and eventually MRI brain which was negative for ischemia and redemonstrated right IJ occlusion as well as a possible thrombus in the bulb  His A1c and lipid panel were within normal limits  He was cleared by Neurology for discharge with follow-up for right IJ thrombus  Please see above list of diagnoses and related plan for additional information  Condition at Discharge: good    Discharge Day Visit / Exam:   Subjective:  Patient feels well, is eating well, has not had any further dizziness  Ready to go home    Vitals: Blood Pressure: 141/82 (05/27/22 0716)  Pulse: 83 (05/27/22 0716)  Temperature: 97 6 °F (36 4 °C) (05/27/22 0716)  Temp Source: Oral (05/25/22 1300)  Respirations: 18 (05/27/22 0716)  Height: 6' 4" (193 cm) (05/25/22 0738)  Weight - Scale: 89 9 kg (198 lb 3 1 oz) (05/25/22 0738)  SpO2: 90 % (05/27/22 0716)  Exam:   Physical Exam  Vitals and nursing note reviewed  Constitutional:       Appearance: Normal appearance  HENT:      Head: Normocephalic and atraumatic  Mouth/Throat:      Mouth: Mucous membranes are moist       Pharynx: Oropharynx is clear  No oropharyngeal exudate  Eyes:      Extraocular Movements: Extraocular movements intact  Cardiovascular:      Rate and Rhythm: Normal rate and regular rhythm  Pulses: Normal pulses  Heart sounds: Normal heart sounds  No murmur heard  No friction rub  No gallop  Pulmonary:      Effort: Pulmonary effort is normal  No respiratory distress  Breath sounds: Normal breath sounds  No stridor  No wheezing or rales  Abdominal:      General: Abdomen is flat  Bowel sounds are normal  There is no distension  Palpations: Abdomen is soft  Tenderness: There is no abdominal tenderness  Musculoskeletal:      Right lower leg: No edema  Left lower leg: No edema  Skin:     General: Skin is warm and dry  Neurological:      General: No focal deficit present  Mental Status: He is alert and oriented to person, place, and time  Discussion with Family: Patient to call his family  Discharge instructions/Information to patient and family:   See after visit summary for information provided to patient and family  Provisions for Follow-Up Care:  See after visit summary for information related to follow-up care and any pertinent home health orders  Disposition:   Home    Planned Readmission:  None     Discharge Statement:  I spent 65 minutes discharging the patient  This time was spent on the day of discharge  I had direct contact with the patient on the day of discharge   Greater than 50% of the total time was spent examining patient, answering all patient questions, arranging and discussing plan of care with patient as well as directly providing post-discharge instructions  Additional time then spent on discharge activities  Discharge Medications:  See after visit summary for reconciled discharge medications provided to patient and/or family        **Please Note: This note may have been constructed using a voice recognition system**

## 2022-05-27 NOTE — PLAN OF CARE
Problem: PAIN - ADULT  Goal: Verbalizes/displays adequate comfort level or baseline comfort level  Description: Interventions:  - Encourage patient to monitor pain and request assistance  - Assess pain using appropriate pain scale  - Administer analgesics based on type and severity of pain and evaluate response  - Implement non-pharmacological measures as appropriate and evaluate response  - Consider cultural and social influences on pain and pain management  - Notify physician/advanced practitioner if interventions unsuccessful or patient reports new pain  Outcome: Progressing     Problem: INFECTION - ADULT  Goal: Absence or prevention of progression during hospitalization  Description: INTERVENTIONS:  - Assess and monitor for signs and symptoms of infection  - Monitor lab/diagnostic results  - Monitor all insertion sites, i e  indwelling lines, tubes, and drains  - Monitor endotracheal if appropriate and nasal secretions for changes in amount and color  - Hopkins appropriate cooling/warming therapies per order  - Administer medications as ordered  - Instruct and encourage patient and family to use good hand hygiene technique  - Identify and instruct in appropriate isolation precautions for identified infection/condition  Outcome: Progressing  Goal: Absence of fever/infection during neutropenic period  Description: INTERVENTIONS:  - Monitor WBC    Outcome: Progressing     Problem: SAFETY ADULT  Goal: Patient will remain free of falls  Description: INTERVENTIONS:  - Educate patient/family on patient safety including physical limitations  - Instruct patient to call for assistance with activity   - Consult OT/PT to assist with strengthening/mobility   - Keep Call bell within reach  - Keep bed low and locked with side rails adjusted as appropriate  - Keep care items and personal belongings within reach  - Initiate and maintain comfort rounds  - Make Fall Risk Sign visible to staff  - Offer Toileting every 2 Hours, in advance of need    - Apply yellow socks and bracelet for high fall risk patients  - Consider moving patient to room near nurses station  Outcome: Progressing  Goal: Maintain or return to baseline ADL function  Description: INTERVENTIONS:  -  Assess patient's ability to carry out ADLs; assess patient's baseline for ADL function and identify physical deficits which impact ability to perform ADLs (bathing, care of mouth/teeth, toileting, grooming, dressing, etc )  - Assess/evaluate cause of self-care deficits   - Assess range of motion  - Assess patient's mobility; develop plan if impaired  - Assess patient's need for assistive devices and provide as appropriate  - Encourage maximum independence but intervene and supervise when necessary  - Involve family in performance of ADLs  - Assess for home care needs following discharge   - Consider OT consult to assist with ADL evaluation and planning for discharge  - Provide patient education as appropriate  Outcome: Progressing  Goal: Maintains/Returns to pre admission functional level  Description: INTERVENTIONS:  - Perform BMAT or MOVE assessment daily    - Set and communicate daily mobility goal to care team and patient/family/caregiver  - Collaborate with rehabilitation services on mobility goals if consulted  - Perform Range of Motion 2 times a day  - Reposition patient every 2 hours    - Dangle patient 2 times a day  - Stand patient 2 times a day  - Ambulate patient 2 times a day  - Out of bed to chair 2 times a day   - Out of bed for meals 2 times a day  - Out of bed for toileting  - Record patient progress and toleration of activity level   Outcome: Progressing     Problem: DISCHARGE PLANNING  Goal: Discharge to home or other facility with appropriate resources  Description: INTERVENTIONS:  - Identify barriers to discharge w/patient and caregiver  - Arrange for needed discharge resources and transportation as appropriate  - Identify discharge learning needs (meds, wound care, etc )  - Arrange for interpretive services to assist at discharge as needed  - Refer to Case Management Department for coordinating discharge planning if the patient needs post-hospital services based on physician/advanced practitioner order or complex needs related to functional status, cognitive ability, or social support system  Outcome: Progressing     Problem: Knowledge Deficit  Goal: Patient/family/caregiver demonstrates understanding of disease process, treatment plan, medications, and discharge instructions  Description: Complete learning assessment and assess knowledge base  Interventions:  - Provide teaching at level of understanding  - Provide teaching via preferred learning methods  Outcome: Progressing     Problem: Neurological Deficit  Goal: Neurological status is stable or improving  Description: Interventions:  - Monitor and assess patient's level of consciousness, motor function, sensory function, and level of assistance needed for ADLs  - Monitor and report changes from baseline  Collaborate with interdisciplinary team to initiate plan and implement interventions as ordered  - Provide and maintain a safe environment  - Consider seizure precautions  - Consider fall precautions  - Consider aspiration precautions  - Consider bleeding precautions  Outcome: Progressing     Problem: Activity Intolerance/Impaired Mobility  Goal: Mobility/activity is maintained at optimum level for patient  Description: Interventions:  - Assess and monitor patient  barriers to mobility and need for assistive/adaptive devices  - Assess patient's emotional response to limitations  - Collaborate with interdisciplinary team and initiate plans and interventions as ordered  - Encourage independent activity per ability   - Maintain proper body alignment  - Perform active/passive rom as tolerated/ordered    - Plan activities to conserve energy   - Turn patient as appropriate  Outcome: Progressing Problem: Communication Impairment  Goal: Ability to express needs and understand communication  Description: Assess patient's communication skills and ability to understand information  Patient will demonstrate use of effective communication techniques, alternative methods of communication and understanding even if not able to speak  - Encourage communication and provide alternate methods of communication as needed  - Collaborate with case management/ for discharge needs  - Include patient/family/caregiver in decisions related to communication  Outcome: Progressing     Problem: Nutrition  Goal: Nutrition/Hydration status is improving  Description: Monitor and assess patient's nutrition/hydration status for malnutrition (ex- brittle hair, bruises, dry skin, pale skin and conjunctiva, muscle wasting, smooth red tongue, and disorientation)  Collaborate with interdisciplinary team and initiate plan and interventions as ordered  Monitor patient's weight and dietary intake as ordered or per policy  Utilize nutrition screening tool and intervene per policy  Determine patient's food preferences and provide high-protein, high-caloric foods as appropriate  - Assist patient with eating   - Allow adequate time for meals   - Encourage patient to take dietary supplement as ordered  - Collaborate with clinical nutritionist   - Include patient/family/caregiver in decisions related to nutrition    Outcome: Progressing     Problem: CARDIOVASCULAR - ADULT  Goal: Maintains optimal cardiac output and hemodynamic stability  Description: INTERVENTIONS:  - Monitor I/O, vital signs and rhythm  - Monitor for S/S and trends of decreased cardiac output  - Administer and titrate ordered vasoactive medications to optimize hemodynamic stability  - Assess quality of pulses, skin color and temperature  - Assess for signs of decreased coronary artery perfusion  - Instruct patient to report change in severity of symptoms  Outcome: Progressing     Problem: Potential for Falls  Goal: Patient will remain free of falls  Description: INTERVENTIONS:  - Educate patient/family on patient safety including physical limitations  - Instruct patient to call for assistance with activity   - Consult OT/PT to assist with strengthening/mobility   - Keep Call bell within reach  - Keep bed low and locked with side rails adjusted as appropriate  - Keep care items and personal belongings within reach  - Initiate and maintain comfort rounds  - Make Fall Risk Sign visible to staff  - Offer Toileting every 2 Hours, in advance of need    - Apply yellow socks and bracelet for high fall risk patients  - Consider moving patient to room near nurses station  Outcome: Progressing

## 2022-05-28 NOTE — QUICK NOTE
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

## 2022-05-31 NOTE — PHYSICIAN ADVISOR
Current patient class: Inpatient  The patient is currently on Hospital Day: 3 at [place] [address]      This patient was originally admitted to the hospital under observation class  After admission the patient was reevaluated and determined to require further hospitalization  The patient was then documented to require at least a 2nd midnight in the hospital  As such the patient was then expected to satisfy the 2 midnight benchmark and was therefore eligible for inpatient admission  After review of the relevant documentation, labs, vital signs and test results, the patient is appropriate for INPATIENT ADMISSION  Admission to the hospital as an inpatient is a complex decision making process which requires the practitioner to consider the patients presenting complaint, history and physical examination and all relevant testing  With this in mind, in this case, the patient was deemed appropriate for INPATIENT ADMISSION  After review of the documentation and testing available at the time of the admission I concur with this clinical determination of medical necessity  Rationale is as follows: The patient is a 14-year-old male who presented to the hospital with dizziness  He was placed under observation class for further evaluation and to exclude stroke  The patient was changed to an inpatient to pursue MRI testing and further neurologic evaluation  MRI showed occluded right IJ vein and possible nonocclusive thrombus in right IJ bulb  Neurology felt that the patient could be discharged to repeat imaging of the head and neck in four weeks and to follow up in the Stroke Clinic  He continued to undergo neurologic checks every 4 hours while hospitalized    The patient was hospitalized for two midnights total     The patients vitals on arrival were   ED Triage Vitals [05/25/22 0738]   Temperature Pulse Respirations Blood Pressure SpO2   (!) 97 3 °F (36 3 °C) 78 18 (!) 239/100 99 % Temp Source Heart Rate Source Patient Position - Orthostatic VS BP Location FiO2 (%)   Tympanic Monitor Sitting Right arm --      Pain Score       5           Past Medical History:   Diagnosis Date    Hypertension     Throat cancer ([facility] [address] )     1992     Past Surgical History:   Procedure Laterality Date    NECK DISSECTION Right     1993    PANCREATICODUODENECTOMY      2017       The patient was admitted to the hospital at  4:28 PM on 5/26/22 for the following diagnosis:  Dizziness [R42]  Dizzy [R42]  Hypertensive urgency [I16 0]    Consults have been placed to:   IP CONSULT TO NEUROLOGY  IP CONSULT TO NEUROLOGY  IP CONSULT TO NUTRITION SERVICES    Vitals:    05/26/22 1524 05/26/22 2157 05/27/22 0428 05/27/22 0716   BP: 123/68 133/62  141/82   BP Location:       Pulse: 65 70  83   Resp: 18 18  18   Temp: (!) 97 4 °F (36 3 °C) 98 1 °F (36 7 °C)  97 6 °F (36 4 °C)   TempSrc:       SpO2: 94% 95% 91% 90%   Weight:       Height:           Most recent labs:    No results for input(s): WBC, HGB, HCT, PLT, K, NA, CALCIUM, BUN, CREATININE, LIPASE, AMYLASE, INR, TROPONINI, CKTOTAL, AST, ALT, ALKPHOS, BILITOT in the last 72 hours  Scheduled Meds:  Continuous Infusions:No current facility-administered medications for this encounter  PRN Meds:      Surgical procedures (if appropriate):

## 2022-06-08 ENCOUNTER — TELEPHONE (OUTPATIENT)
Dept: NEUROLOGY | Facility: CLINIC | Age: 77
End: 2022-06-08

## 2022-06-08 NOTE — TELEPHONE ENCOUNTER
----- Message from Gorge Moreno PA-C sent at 6/3/2022  2:26 PM EDT -----  Regarding: ELIZ Dawn,     Can you possibly assist getting this patient in to be seen in stroke follow-up? Dr Osvaldo Govea reached out to me today and requested that he be seen in 4 weeks in the stroke clinic, I think likely will need to be seen by an attending       Catalina Daugherty Pt called requesting to speak to MA.  She states the new script sent for diabetes medication is $600.  Pt can be reached on her home phone. [phone] (home) [phone] (work)

## 2022-06-08 NOTE — TELEPHONE ENCOUNTER
Mónica Salazar, is there any way we can schedule this patient with Dr Eliseo Barahona or Dr Trever Pereira per Dr Peyton Doan's recommendations? Thank you!

## 2022-07-11 ENCOUNTER — TELEPHONE (OUTPATIENT)
Dept: NEUROLOGY | Facility: CLINIC | Age: 77
End: 2022-07-11

## 2022-07-11 NOTE — TELEPHONE ENCOUNTER
Called patient LVM to patient that I schedule appointment  with Dr Laura Colon  in [facility] on 8/04/2022 at [facility] [address]

## 2022-07-13 ENCOUNTER — OFFICE VISIT (OUTPATIENT)
Dept: NEUROSURGERY | Facility: CLINIC | Age: 77
End: 2022-07-13
Payer: MEDICARE

## 2022-07-13 VITALS
BODY MASS INDEX: 25.61 KG/M2 | OXYGEN SATURATION: 96 % | TEMPERATURE: 98.1 F | HEIGHT: 75 IN | SYSTOLIC BLOOD PRESSURE: 140 MMHG | WEIGHT: 206 LBS | HEART RATE: 76 BPM | DIASTOLIC BLOOD PRESSURE: 96 MMHG

## 2022-07-13 DIAGNOSIS — I72.9 ANEURYSM (HCC): ICD-10-CM

## 2022-07-13 PROBLEM — I67.1 CEREBRAL ANEURYSM, NONRUPTURED: Status: ACTIVE | Noted: 2022-07-13

## 2022-07-13 PROCEDURE — 99203 OFFICE O/P NEW LOW 30 MIN: CPT | Performed by: NURSE PRACTITIONER

## 2022-07-13 NOTE — PROGRESS NOTES
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  · Noted incidentally during workup while hospitalized at [facility] on 5/25-27 for dizziness  · Neurology following for occluded right internal jugular vein  · No further episodes of dizziness  · No family history of aneurysm or sudden death  · History of hypertension, managed with Coreg  · History of cigarette smoking, quit in 1990  · Current exam is non-focal      Imaging:  · MRI brain w/wo, 5/27/2022: 1  No acute ischemic disease  2   Mild chronic microangiopathy  3   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, likely chronic  · CTA stroke alert head and neck 5/25/2022: No stenosis, occlusion or thrombosis of the cervical or intracranial vasculature  No evidence of dissection, as clinically questioned  3 mm basilar tip aneurysm is present  Plan:  · Reviewed imaging extensively with patient  · Extensively discussed natural history of aneurysms  Discussed that based on UCAS he has a < 1%/yr risk of aneurysm rupture (approximately 0 23%/year, 0% per ISUIA)  · Discussed modifiable risk factors including hypertension, and smoking  · Discussed signs and symptoms of aneurysm rupture including severe, sudden onset headache, neck pain, nausea and vomiting, and seizure  Reiterated that these symptoms should prompt her to visit an emergency department immediately  · Do not recommend any treatment at this time secondary to low risk for rupture  Ongoing surveillance also is not necessary  · Follow up as needed         Diagnoses and all orders for this visit:    Aneurysm [facility])  -     Ambulatory Referral to Neurosurgery  -     Cancel: CTA head w wo contrast; Future  -     Cancel: BUN/Creatinine Ratio; Future          I spent 20 minutes with the patient today in which >50% of the time was spent counseling/coordination of care regarding diagnosis, imaging review, symptoms and treatment plan  CHIEF COMPLAINT    Chief Complaint   Patient presents with    Consult    Aneurysm       HISTORY    History of Present Illness     68y o  year old male past medical history of IPMN an of pancreas, hypertension, who presents for evaluation of incidentally noted basilar tip aneurysm  He states in May of this year he was watching television about 11:00 a m  at night he suddenly became very dizzy  He called EMS was transferred to the hospital for stroke workup  Imaging was negative except for the incidental 3 mm basilar tip aneurysm  He states since that time he is felt well  He denies any further episodes of dizziness  He denies any visual disturbance  Denies any headaches  He has a follow-up scheduled with Neurology for finding of nonocclusive thrombus in his right jugular vein  He lives by himself but has 2 sons who live nearby  He remains active and plays softball twice weekly  HPI    See Discussion    REVIEW OF SYSTEMS    Review of Systems   Constitutional: Negative  HENT: Negative  Eyes: Negative  Respiratory: Negative  Cardiovascular: Negative  Gastrointestinal: Negative  Endocrine: Negative  Genitourinary: Negative  Musculoskeletal: Negative  Skin: Negative  Allergic/Immunologic: Negative  Neurological: Negative  Hematological: Negative  Psychiatric/Behavioral: Negative  All other systems reviewed and are negative  ROS reviewed and edited as needed  Meds/Allergies     Current Outpatient Medications   Medication Sig Dispense Refill    carvedilol (COREG) 12 5 mg tablet Take 12 5 mg by mouth in the morning and 12 5 mg in the evening  Take with meals        meclizine (ANTIVERT) 25 mg tablet Take 1 tablet (25 mg total) by mouth every 8 (eight) hours as needed for dizziness 30 tablet 0    Pancrelipase, Lip-Prot-Amyl, (Zenpep) 62405-57995 units CPEP Take 1 capsule by mouth 3 (three) times a day with meals      simvastatin (ZOCOR) 20 mg tablet Take 20 mg by mouth every evening       No current facility-administered medications for this visit  Allergies   Allergen Reactions    Gadolinium Headache       PAST HISTORY    Past Medical History:   Diagnosis Date    Hypertension     Throat cancer ([address] )            Past Surgical History:   Procedure Laterality Date    NECK DISSECTION Right     1993    PANCREATICODUODENECTOMY      2017       Social History     Tobacco Use    Smoking status: Former Smoker     Quit date:      Years since quittin 5    Smokeless tobacco: Former User   Vaping Use    Vaping Use: Never used   Substance Use Topics    Alcohol use: Yes     Comment: social    Drug use: Never       History reviewed  No pertinent family history  Above history personally reviewed  EXAM    Vitals:Blood pressure 140/96, pulse 76, temperature 98 1 °F (36 7 °C), temperature source Temporal, height 6' 3 25" (1 911 m), weight 93 4 kg (206 lb), SpO2 96 %  ,Body mass index is 25 58 kg/m²  Physical Exam  Constitutional:       General: He is not in acute distress  Appearance: He is well-developed  He is not diaphoretic  Eyes:      General:         Right eye: No discharge  Left eye: No discharge  Extraocular Movements: EOM normal       Conjunctiva/sclera: Conjunctivae normal       Pupils: Pupils are equal, round, and reactive to light  Pulmonary:      Effort: Pulmonary effort is normal  No respiratory distress  Abdominal:      General: Bowel sounds are normal  There is no distension  Palpations: Abdomen is soft  Tenderness: There is no abdominal tenderness  Musculoskeletal:         General: Normal range of motion  Cervical back: Normal range of motion and neck supple     Skin:     General: Skin is warm and dry  Neurological:      Mental Status: He is alert and oriented to person, place, and time  Cranial Nerves: No cranial nerve deficit  Sensory: No sensory deficit  Motor: No weakness  Coordination: Coordination normal  Finger-Nose-Finger Test normal       Deep Tendon Reflexes: Reflexes normal    Psychiatric:         Speech: Speech normal          Behavior: Behavior normal          Thought Content: Thought content normal          Judgment: Judgment normal          Neurologic Exam     Mental Status   Oriented to person, place, and time  Oriented to person  Oriented to place  Oriented to time  Oriented to year, month and date  Registration: recalls 3 of 3 objects  Attention: normal  Concentration: normal    Speech: speech is normal   Level of consciousness: alert  Knowledge: good and consistent with education  Able to name object  Cranial Nerves   Cranial nerves II through XII intact  CN III, IV, VI   Pupils are equal, round, and reactive to light  Extraocular motions are normal    Right pupil: Size: 3 mm  Shape: regular  Reactivity: brisk  Consensual response: intact  Accommodation: intact  Left pupil: Size: 3 mm  Shape: regular  Reactivity: brisk  Consensual response: intact  Accommodation: intact  Nystagmus: none   Diplopia: none  Conjugate gaze: present    CN V   Right facial sensation deficit: none  Left facial sensation deficit: none    CN VII   Facial expression full, symmetric       CN VIII   Hearing: intact    CN IX, X   Palate: symmetric    CN XI   Right sternocleidomastoid strength: normal  Left sternocleidomastoid strength: normal  Right trapezius strength: normal  Left trapezius strength: normal    CN XII   Tongue: not atrophic  Fasciculations: absent  Tongue deviation: none    Motor Exam   Muscle bulk: normal  Overall muscle tone: normal  Right arm pronator drift: absent  Left arm pronator drift: absent    Strength   Right deltoid: 5/5  Left deltoid:   Right biceps: 5  Left biceps:   Right triceps:   Left triceps:   Right quadriceps:   Left quadriceps:   Right hamstrin/5  Left hamstrin/5  Right anterior tibial:   Left anterior tibial:   Right posterior tibial:   Left posterior tibial:   Right peroneal:   Left peroneal:   Right gastroc:   Left gastroc:     Sensory Exam   Light touch normal    Proprioception normal      Gait, Coordination, and Reflexes     Coordination   Finger to nose coordination: normal    Tremor   Resting tremor: absent  Intention tremor: absent  Action tremor: absent        MEDICAL DECISION MAKING    Imaging Studies:     No results found  I have personally reviewed pertinent reports     and I have personally reviewed pertinent films in PACS

## 2022-07-13 NOTE — ASSESSMENT & PLAN NOTE
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  · Noted incidentally during workup while hospitalized at [facility] on 5/25-27 for dizziness  · Neurology following for occluded right internal jugular vein  · No further episodes of dizziness  · No family history of aneurysm or sudden death  · History of hypertension, managed with Coreg  · History of cigarette smoking, quit in 1990  · Current exam is non-focal      Imaging:  · MRI brain w/wo, 5/27/2022: 1  No acute ischemic disease  2   Mild chronic microangiopathy  3   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, likely chronic  · CTA stroke alert head and neck 5/25/2022: No stenosis, occlusion or thrombosis of the cervical or intracranial vasculature  No evidence of dissection, as clinically questioned  3 mm basilar tip aneurysm is present  Plan:  · Reviewed imaging extensively with patient  · Extensively discussed natural history of aneurysms  Discussed that based on UCAS he has a < 1%/yr risk of aneurysm rupture (approximately 0 23%/year, 0% per ISUIA)  · Discussed modifiable risk factors including hypertension, and smoking  · Discussed signs and symptoms of aneurysm rupture including severe, sudden onset headache, neck pain, nausea and vomiting, and seizure  Reiterated that these symptoms should prompt her to visit an emergency department immediately  · Do not recommend any treatment at this time secondary to low risk for rupture  Ongoing surveillance also is not necessary  · Follow up as needed